# Patient Record
Sex: FEMALE | Race: WHITE | NOT HISPANIC OR LATINO | Employment: FULL TIME | URBAN - METROPOLITAN AREA
[De-identification: names, ages, dates, MRNs, and addresses within clinical notes are randomized per-mention and may not be internally consistent; named-entity substitution may affect disease eponyms.]

---

## 2022-01-03 ENCOUNTER — LAB (OUTPATIENT)
Dept: LAB | Facility: HOSPITAL | Age: 27
End: 2022-01-03

## 2022-01-03 ENCOUNTER — TRANSCRIBE ORDERS (OUTPATIENT)
Dept: LAB | Facility: HOSPITAL | Age: 27
End: 2022-01-03

## 2022-01-03 DIAGNOSIS — Z3A.15 15 WEEKS GESTATION OF PREGNANCY: Primary | ICD-10-CM

## 2022-01-03 DIAGNOSIS — Z34.82 PRENATAL CARE, SUBSEQUENT PREGNANCY, SECOND TRIMESTER: ICD-10-CM

## 2022-01-03 DIAGNOSIS — Z3A.15 15 WEEKS GESTATION OF PREGNANCY: ICD-10-CM

## 2022-01-03 LAB
ABO GROUP BLD: NORMAL
AMPHET+METHAMPHET UR QL: NEGATIVE
AMPHETAMINES UR QL: NEGATIVE
BARBITURATES UR QL SCN: NEGATIVE
BASOPHILS # BLD AUTO: 0.03 10*3/MM3 (ref 0–0.2)
BASOPHILS NFR BLD AUTO: 0.4 % (ref 0–1.5)
BENZODIAZ UR QL SCN: NEGATIVE
BILIRUB UR QL STRIP: NEGATIVE
BLD GP AB SCN SERPL QL: NEGATIVE
BUPRENORPHINE SERPL-MCNC: NEGATIVE NG/ML
CANNABINOIDS SERPL QL: NEGATIVE
CLARITY UR: CLEAR
COCAINE UR QL: NEGATIVE
COLOR UR: YELLOW
DEPRECATED RDW RBC AUTO: 38.9 FL (ref 37–54)
EOSINOPHIL # BLD AUTO: 0.15 10*3/MM3 (ref 0–0.4)
EOSINOPHIL NFR BLD AUTO: 1.8 % (ref 0.3–6.2)
ERYTHROCYTE [DISTWIDTH] IN BLOOD BY AUTOMATED COUNT: 11.8 % (ref 12.3–15.4)
GLUCOSE UR STRIP-MCNC: NEGATIVE MG/DL
HCT VFR BLD AUTO: 34.6 % (ref 34–46.6)
HGB BLD-MCNC: 12 G/DL (ref 12–15.9)
HGB UR QL STRIP.AUTO: NEGATIVE
IMM GRANULOCYTES # BLD AUTO: 0.03 10*3/MM3 (ref 0–0.05)
IMM GRANULOCYTES NFR BLD AUTO: 0.4 % (ref 0–0.5)
KETONES UR QL STRIP: NEGATIVE
LEUKOCYTE ESTERASE UR QL STRIP.AUTO: NEGATIVE
LYMPHOCYTES # BLD AUTO: 1.78 10*3/MM3 (ref 0.7–3.1)
LYMPHOCYTES NFR BLD AUTO: 20.8 % (ref 19.6–45.3)
MCH RBC QN AUTO: 31.5 PG (ref 26.6–33)
MCHC RBC AUTO-ENTMCNC: 34.7 G/DL (ref 31.5–35.7)
MCV RBC AUTO: 90.8 FL (ref 79–97)
METHADONE UR QL SCN: NEGATIVE
MONOCYTES # BLD AUTO: 0.59 10*3/MM3 (ref 0.1–0.9)
MONOCYTES NFR BLD AUTO: 6.9 % (ref 5–12)
NEUTROPHILS NFR BLD AUTO: 5.97 10*3/MM3 (ref 1.7–7)
NEUTROPHILS NFR BLD AUTO: 69.7 % (ref 42.7–76)
NITRITE UR QL STRIP: NEGATIVE
NRBC BLD AUTO-RTO: 0 /100 WBC (ref 0–0.2)
OPIATES UR QL: NEGATIVE
OXYCODONE UR QL SCN: NEGATIVE
PCP UR QL SCN: NEGATIVE
PH UR STRIP.AUTO: 7 [PH] (ref 5–8)
PLATELET # BLD AUTO: 270 10*3/MM3 (ref 140–450)
PMV BLD AUTO: 9.2 FL (ref 6–12)
PROPOXYPH UR QL: NEGATIVE
PROT UR QL STRIP: NEGATIVE
RBC # BLD AUTO: 3.81 10*6/MM3 (ref 3.77–5.28)
RH BLD: POSITIVE
SP GR UR STRIP: 1.01 (ref 1–1.03)
TRICYCLICS UR QL SCN: NEGATIVE
UROBILINOGEN UR QL STRIP: NORMAL
WBC NRBC COR # BLD: 8.55 10*3/MM3 (ref 3.4–10.8)

## 2022-01-03 PROCEDURE — 36415 COLL VENOUS BLD VENIPUNCTURE: CPT

## 2022-01-03 PROCEDURE — 80306 DRUG TEST PRSMV INSTRMNT: CPT

## 2022-01-03 PROCEDURE — 86803 HEPATITIS C AB TEST: CPT

## 2022-01-03 PROCEDURE — 80081 OBSTETRIC PANEL INC HIV TSTG: CPT

## 2022-01-03 PROCEDURE — 81003 URINALYSIS AUTO W/O SCOPE: CPT

## 2022-01-03 PROCEDURE — 82947 ASSAY GLUCOSE BLOOD QUANT: CPT

## 2022-01-04 LAB
GLUCOSE SERPL-MCNC: 81 MG/DL (ref 65–99)
HBV SURFACE AG SERPL QL IA: NORMAL
HCV AB SER DONR QL: NORMAL
HIV1+2 AB SER QL: NORMAL
RPR SER QL: NORMAL

## 2022-01-06 LAB — RUBV IGG SERPL IA-ACNC: 11.2 INDEX

## 2022-04-19 ENCOUNTER — TELEPHONE (OUTPATIENT)
Dept: ENDOCRINOLOGY | Facility: CLINIC | Age: 27
End: 2022-04-19

## 2022-04-29 ENCOUNTER — OFFICE VISIT (OUTPATIENT)
Dept: ENDOCRINOLOGY | Facility: CLINIC | Age: 27
End: 2022-04-29

## 2022-04-29 VITALS
DIASTOLIC BLOOD PRESSURE: 58 MMHG | HEIGHT: 64 IN | HEART RATE: 88 BPM | WEIGHT: 148 LBS | OXYGEN SATURATION: 97 % | SYSTOLIC BLOOD PRESSURE: 114 MMHG | BODY MASS INDEX: 25.27 KG/M2

## 2022-04-29 DIAGNOSIS — O24.419 GESTATIONAL DIABETES MELLITUS (GDM), ANTEPARTUM, GESTATIONAL DIABETES METHOD OF CONTROL UNSPECIFIED: Primary | ICD-10-CM

## 2022-04-29 LAB
EXPIRATION DATE: ABNORMAL
EXPIRATION DATE: NORMAL
EXPIRATION DATE: NORMAL
GLUCOSE BLDC GLUCOMTR-MCNC: 63 MG/DL (ref 70–130)
GLUCOSE BLDC GLUCOMTR-MCNC: 76 MG/DL (ref 70–130)
HBA1C MFR BLD: 4.8 %
Lab: ABNORMAL
Lab: NORMAL
Lab: NORMAL

## 2022-04-29 PROCEDURE — 82947 ASSAY GLUCOSE BLOOD QUANT: CPT | Performed by: INTERNAL MEDICINE

## 2022-04-29 PROCEDURE — 99203 OFFICE O/P NEW LOW 30 MIN: CPT | Performed by: INTERNAL MEDICINE

## 2022-04-29 PROCEDURE — 83036 HEMOGLOBIN GLYCOSYLATED A1C: CPT | Performed by: INTERNAL MEDICINE

## 2022-04-29 RX ORDER — LANCETS 30 GAUGE
EACH MISCELLANEOUS
Qty: 200 EACH | Refills: 1 | Status: SHIPPED | OUTPATIENT
Start: 2022-04-29

## 2022-04-29 RX ORDER — GLUCOSAMINE HCL/CHONDROITIN SU 500-400 MG
CAPSULE ORAL
Qty: 200 EACH | Refills: 1 | Status: SHIPPED | OUTPATIENT
Start: 2022-04-29 | End: 2022-06-28 | Stop reason: HOSPADM

## 2022-04-29 RX ORDER — GLUCOSAMINE HCL/CHONDROITIN SU 500-400 MG
CAPSULE ORAL
COMMUNITY
End: 2022-04-29 | Stop reason: SDUPTHER

## 2022-04-29 RX ORDER — LANCETS 30 GAUGE
EACH MISCELLANEOUS
COMMUNITY
End: 2022-04-29 | Stop reason: SDUPTHER

## 2022-04-29 RX ORDER — PRENATAL VIT NO.126/IRON/FOLIC 28MG-0.8MG
TABLET ORAL 3 TIMES DAILY
COMMUNITY

## 2022-04-29 NOTE — PROGRESS NOTES
Chief Complaint   Patient presents with   • Gestational Diabetes     TORIE 6-21-22        New patient who is being seen in consultation regarding gestational diabetes at the request of Samantha Azevedo MD     HPI   Vane Maguire is a 26 y.o. female who presents for evaluation of gestational diabetes.    Patient presents for evaluation of gestational diabetes. She is 32 weeks gestation, this is her first pregnancy. She denies any personal history of diabetes or prediabetes. She does report that her sister had gestational diabetes. She was diagnosed via routine screening in pregnancy, gestational GCT with value of 168, 3-hour OGTT with values of71 fasting, 183 1 hour, 162 at 2 hours, 106 at 3 hours.  Patient denies any food allergies or food aversions.  She is not following any particular diet prior to diagnosis.  She does report that she is started working to become more mindful of starches since diagnosis.  Patient reports she had an recent ultrasound due to previous low-lying placenta.  She reports that baby is measuring normally at the time of the last ultrasound.  She denies any other complications during this pregnancy.  She reports she is drinking mostly water.  She has not yet begun glycemic monitoring.    Past Medical History:   Diagnosis Date   • Gestational diabetes      Past Surgical History:   Procedure Laterality Date   • LASIK        Family History   Problem Relation Age of Onset   • Hypertension Father       Social History     Socioeconomic History   • Marital status:    Tobacco Use   • Smoking status: Never Smoker   • Smokeless tobacco: Never Used   Vaping Use   • Vaping Use: Never used   Substance and Sexual Activity   • Alcohol use: Never   • Drug use: Never   • Sexual activity: Defer      No Known Allergies   Current Outpatient Medications on File Prior to Visit   Medication Sig Dispense Refill   • Blood Glucose-BP Monitor device 1 kit. Test four times as directed ok to change to ins  "formulary     • prenatal vitamin (prenatal, CLASSIC, vitamin) tablet Take  by mouth 3 (Three) Times a Day.     • [DISCONTINUED] acetone, urine, test strip 1 strip Daily. Use as directed to test for sugar in the urine     • [DISCONTINUED] Glucose Blood (Blood Glucose Test) strip by In Vitro route. Test four times as directed ok to change to ins formulary     • [DISCONTINUED] Lancets misc Test four times as directed ok to change to ins formulary       No current facility-administered medications on file prior to visit.        Review of Systems   Constitutional: Positive for fatigue. Negative for unexpected weight gain and unexpected weight loss.   HENT: Negative for trouble swallowing and voice change.    Eyes: Negative for pain and visual disturbance.   Respiratory: Negative for cough and shortness of breath.    Cardiovascular: Negative for palpitations and leg swelling.   Gastrointestinal: Negative for nausea and vomiting.   Endocrine: Negative for polydipsia and polyuria.   Musculoskeletal: Negative for arthralgias and myalgias.   Skin: Negative for dry skin and rash.   Neurological: Negative for tremors and headache.   Psychiatric/Behavioral: Negative for sleep disturbance. The patient is not nervous/anxious.        Vitals:    04/29/22 1109   BP: 114/58   BP Location: Left arm   Patient Position: Sitting   Cuff Size: Adult   Pulse: 88   SpO2: 97%   Weight: 67.1 kg (148 lb)   Height: 162.6 cm (64\")   Body mass index is 25.4 kg/m².     Physical Exam  Vitals reviewed.   Constitutional:       General: She is not in acute distress.     Appearance: Normal appearance.   HENT:      Head: Normocephalic and atraumatic.      Right Ear: Hearing normal.      Left Ear: Hearing normal.      Nose: Nose normal.   Eyes:      General: Lids are normal.      Conjunctiva/sclera: Conjunctivae normal.   Neck:      Thyroid: No thyromegaly or thyroid tenderness.   Cardiovascular:      Rate and Rhythm: Normal rate and regular rhythm.      " Heart sounds: No murmur heard.  Pulmonary:      Effort: Pulmonary effort is normal.      Breath sounds: Normal breath sounds.   Abdominal:      General: Bowel sounds are normal.      Tenderness: There is no abdominal tenderness.      Comments: gravid   Lymphadenopathy:      Head:      Right side of head: No submandibular adenopathy.      Left side of head: No submandibular adenopathy.      Cervical: No cervical adenopathy.   Skin:     General: Skin is warm and dry.   Neurological:      General: No focal deficit present.      Deep Tendon Reflexes: Reflexes are normal and symmetric.   Psychiatric:         Mood and Affect: Mood and affect normal.         Behavior: Behavior is cooperative.         Labs/Imaging   Latest Reference Range & Units 04/29/22 11:40 04/29/22 11:41 04/29/22 12:01   Glucose 70 - 130 mg/dL 63 !  76   Hemoglobin A1C %  4.8    !: Data is abnormal    Assessment and Plan    Diagnoses and all orders for this visit:    1. Gestational diabetes mellitus (GDM), antepartum, gestational diabetes method of control unspecified (Primary)  -currently 32 weeks gestation  -Diagnosed via routine screening in pregnancy.  -Hemoglobin A1c 4.8% during visit today, discussed that this is normal but can be falsely low in pregnancy.  Discussed that gestational diabetes is a risk factor for development of type 2 diabetes later in life or gestational diabetes in a subsequent pregnancy.  -Patient mildly hypoglycemic upon arrival to office, this corrected with intake of carbohydrates.  Likely secondary to prolonged fasting state, patient is asymptomatic.  Reviewed hypoglycemia management.  Discussed importance of snacks between meals.  -Patient has not yet monitoring blood glucose, supplies sent to pharmacy today  --Reviewed instructions for glycemic monitoring and the monitoring of urine ketones.  -Discussed that gestational diabetes can become harder to control as pregnancy progresses. Reviewed need for routine follow up  from now until delivery.   -Patient instructed to send glucose data weekly via [employer].  -Risks of gestational diabetes were reviewed, these include, but are not limited to: LGA infant, macrosomia, stillbirth,  hypoglycemia, hyperbilirubinemia, birth injury,  birth, respiratory complications following delivery, polyhydramnios, hypocalcemia, maternal preeclampsia.  -Nutritional goals reviewed: Patient advised to eat 3 moderate sized meals daily as well as 2-4 snacks, including a bedtime snack. Discussed need for carbohydrate awareness. Patient given goals for carbohydrate intake for meals/snacks.  -Glycemic Targets during pregnancy were reviewed, as follows: fasting glucose <95, 1 hour post prandial <140, 2 hour postprandial <120.  -Patient advised that she will need to monitor blood glucose up to 6-8 times daily. In the event that insurance may not cover adequate testing supplies, she was instructed she may need to purchase on OTC meter and strips.  -Patient will return for follow up in 2 weeks. She was instructed to contact the office in the interim if glucoses not at target.  -     POC Glucose, Blood  -     POC Glycosylated Hemoglobin (Hb A1C)      Return in about 2 weeks (around 2022) for Next scheduled follow up. The patient was instructed to contact the clinic with any interval questions or concerns.    Orquidea Martinez MD     Dictated Utilizing Dragon Dictation

## 2022-05-12 ENCOUNTER — TELEPHONE (OUTPATIENT)
Dept: ENDOCRINOLOGY | Facility: CLINIC | Age: 27
End: 2022-05-12

## 2022-05-12 NOTE — TELEPHONE ENCOUNTER
Spoke to pt-dr castillo does not have any more openings on 5/19.  Transferred to front office to change appt.

## 2022-05-12 NOTE — TELEPHONE ENCOUNTER
Patient would like to know if she can come in at a later time on 05/19/2022 due to her having a obgyn appointment on that same day and around that same time?. The patient also states that she is willing to be work in on another day as well .    Please advise and call the patient back in regards to this matter at [phone].

## 2022-05-19 ENCOUNTER — OFFICE VISIT (OUTPATIENT)
Dept: ENDOCRINOLOGY | Facility: CLINIC | Age: 27
End: 2022-05-19

## 2022-05-19 VITALS
BODY MASS INDEX: 25.78 KG/M2 | OXYGEN SATURATION: 99 % | WEIGHT: 151 LBS | DIASTOLIC BLOOD PRESSURE: 60 MMHG | SYSTOLIC BLOOD PRESSURE: 120 MMHG | HEIGHT: 64 IN | HEART RATE: 84 BPM

## 2022-05-19 DIAGNOSIS — O24.419 GESTATIONAL DIABETES MELLITUS (GDM), ANTEPARTUM, GESTATIONAL DIABETES METHOD OF CONTROL UNSPECIFIED: Primary | ICD-10-CM

## 2022-05-19 LAB
EXPIRATION DATE: NORMAL
GLUCOSE BLDC GLUCOMTR-MCNC: 88 MG/DL (ref 70–130)
Lab: NORMAL

## 2022-05-19 PROCEDURE — 99212 OFFICE O/P EST SF 10 MIN: CPT | Performed by: INTERNAL MEDICINE

## 2022-05-19 PROCEDURE — 82947 ASSAY GLUCOSE BLOOD QUANT: CPT | Performed by: INTERNAL MEDICINE

## 2022-05-19 NOTE — PROGRESS NOTES
"Chief Complaint   Patient presents with   • Gestational Diabetes        HPI   Vane Maguire is a 26 y.o. female had concerns including Gestational Diabetes.     Patient is now approximately 35 weeks gestation.  She saw OB earlier today and was told the baby is measuring normally.  She reports there was delay in obtaining her testing supplies and states she only started monitoring blood glucose approximately 4 days ago.  She did send values from earlier this week, all of which were controlled.  She reports she has not made any significant dietary changes that she wanted to see what values would look like on her usual diet.  She denies any increased thirst or increased urination.  She denies any nausea or vomiting.  She reports baby is moving well.    The following portions of the patient's history were reviewed and updated as appropriate: allergies, current medications and past social history.    Review of Systems   Gastrointestinal: Negative for abdominal pain, nausea and vomiting.   Endocrine: Negative for polydipsia and polyuria.      /60 (BP Location: Left arm, Patient Position: Sitting, Cuff Size: Adult)   Pulse 84   Ht 162.6 cm (64\")   Wt 68.5 kg (151 lb)   SpO2 99%   BMI 25.92 kg/m²      Physical Exam      Constitutional:  well developed; well nourished  no acute distress   ENT/Thyroid: not examined   Eyes: Conjunctiva: clear   Respiratory:  breathing is unlabored  clear to auscultation bilaterally   Cardiovascular:  regular rate and rhythm   Chest:  Not performed.   Abdomen: gravid   : Not performed.   Musculoskeletal: Not performed   Skin: not performed.   Neuro: mental status, speech normal   Psych: mood and affect are within normal limits       Labs/Imaging   Latest Reference Range & Units 05/19/22 14:44   Glucose 70 - 130 mg/dL 88       Diagnoses and all orders for this visit:    1. Gestational diabetes mellitus (GDM), antepartum, gestational diabetes method of control unspecified " (Primary)  -Currently 35 weeks gestation  -Diet-controlled, all values in the past 4 days at goal  --Reviewed instructions for glycemic monitoring and the monitoring of urine ketones.  -Discussed that gestational diabetes can become harder to control as pregnancy progresses. Reviewed need for routine follow up from now until delivery.   -Patient instructed to send glucose data weekly via "[employer].  -Risks of gestational diabetes were reviewed, these include, but are not limited to: LGA infant, macrosomia, stillbirth,  hypoglycemia, hyperbilirubinemia, birth injury,  birth, respiratory complications following delivery, polyhydramnios, hypocalcemia, maternal preeclampsia.  -Nutritional goals reviewed: Patient advised to eat 3 moderate sized meals daily as well as 2-4 snacks, including a bedtime snack. Discussed need for carbohydrate awareness. Patient given goals for carbohydrate intake for meals/snacks.  -Glycemic Targets during pregnancy were reviewed, as follows: fasting glucose <95, 1 hour post prandial <140, 2 hour postprandial <120.  -Patient will return for follow up in 3 weeks. She was instructed to contact the office in the interim if glucoses not at target.  -     POC Glucose, Blood      No follow-ups on file. The patient was instructed to contact the clinic with any interval questions or concerns.    Orquidea Martinez MD   Endocrinologist    Dictated Utilizing Dragon Dictation

## 2022-06-09 ENCOUNTER — OFFICE VISIT (OUTPATIENT)
Dept: ENDOCRINOLOGY | Facility: CLINIC | Age: 27
End: 2022-06-09

## 2022-06-09 VITALS
HEIGHT: 64 IN | WEIGHT: 154 LBS | BODY MASS INDEX: 26.29 KG/M2 | OXYGEN SATURATION: 98 % | SYSTOLIC BLOOD PRESSURE: 114 MMHG | DIASTOLIC BLOOD PRESSURE: 62 MMHG | HEART RATE: 84 BPM

## 2022-06-09 DIAGNOSIS — O24.419 GESTATIONAL DIABETES MELLITUS (GDM), ANTEPARTUM, GESTATIONAL DIABETES METHOD OF CONTROL UNSPECIFIED: Primary | ICD-10-CM

## 2022-06-09 LAB
EXPIRATION DATE: NORMAL
EXPIRATION DATE: NORMAL
GLUCOSE BLDC GLUCOMTR-MCNC: 123 MG/DL (ref 70–130)
HBA1C MFR BLD: 4.9 %
Lab: NORMAL
Lab: NORMAL

## 2022-06-09 PROCEDURE — 83036 HEMOGLOBIN GLYCOSYLATED A1C: CPT | Performed by: INTERNAL MEDICINE

## 2022-06-09 PROCEDURE — 99213 OFFICE O/P EST LOW 20 MIN: CPT | Performed by: INTERNAL MEDICINE

## 2022-06-09 PROCEDURE — 82947 ASSAY GLUCOSE BLOOD QUANT: CPT | Performed by: INTERNAL MEDICINE

## 2022-06-09 PROCEDURE — 3044F HG A1C LEVEL LT 7.0%: CPT | Performed by: INTERNAL MEDICINE

## 2022-06-09 NOTE — PROGRESS NOTES
"Chief Complaint   Patient presents with   • Gestational Diabetes        HPI   Vane Maguire is a 27 y.o. female had concerns including Gestational Diabetes.      Patient presents for follow-up of gestational diabetes.  Her glucose data was reviewed and she has not had any glucose values above goal.  She is scheduled to follow-up with OB at the end of next week.  There are no current plans for induction of labor.  She reports that she generally feels well.  She denies any issues with dietary restrictions.  She reports that baby is moving well and was measuring normally at the time of most recent ultrasound.    The following portions of the patient's history were reviewed and updated as appropriate: allergies, current medications and past social history.    Review of Systems   Cardiovascular: Negative for palpitations.   Gastrointestinal: Negative for nausea and vomiting.   Endocrine: Negative for polydipsia and polyuria.      /62 (BP Location: Left arm, Patient Position: Sitting, Cuff Size: Adult)   Pulse 84   Ht 162.6 cm (64\")   Wt 69.9 kg (154 lb)   SpO2 98%   BMI 26.43 kg/m²      Physical Exam      Constitutional:  well developed; well nourished  no acute distress  appears stated age   ENT/Thyroid: not examined   Eyes: Conjunctiva: clear   Respiratory:  breathing is unlabored  clear to auscultation bilaterally   Cardiovascular:  regular rate and rhythm   Chest:  Not performed.   Abdomen: gravid   : Not performed.   Musculoskeletal: Not performed   Skin: not performed.   Neuro: mental status, speech normal   Psych: mood and affect are within normal limits       Labs/Imaging   Latest Reference Range & Units 06/09/22 10:51   Glucose 70 - 130 mg/dL 123   Hemoglobin A1C % 4.9       Diagnoses and all orders for this visit:    1. Gestational diabetes mellitus (GDM), antepartum, gestational diabetes method of control unspecified (Primary)  -Currently in third trimester, diet-controlled  -All values at " goal in the past 2 and half weeks  -Reviewed instructions for glycemic monitoring and the monitoring of urine ketones.  -Discussed that gestational diabetes can become harder to control as pregnancy progresses. Reviewed need for routine follow up from now until delivery.   -Patient instructed to send glucose data weekly via [employer].  -Risks of gestational diabetes were reviewed, these include, but are not limited to: LGA infant, macrosomia, stillbirth,  hypoglycemia, hyperbilirubinemia, birth injury,  birth, respiratory complications following delivery, polyhydramnios, hypocalcemia, maternal preeclampsia.  -Nutritional goals reviewed: Patient advised to eat 3 moderate sized meals daily as well as 2-4 snacks, including a bedtime snack. Discussed need for carbohydrate awareness. Patient given goals for carbohydrate intake for meals/snacks.  -Glycemic Targets during pregnancy were reviewed, as follows: fasting glucose <95, 1 hour post prandial <140, 2 hour postprandial <120.  -Patient was advised that she may discontinue routine glycemic monitoring following delivery.  We did review that gestational diabetes is a risk factor for development of type 2 diabetes later in life or gestational diabetes is other than pregnancy.  We will plan for follow-up 3 months postpartum, sooner if needed.  -     POC Glucose, Blood  -     POC Glycosylated Hemoglobin (Hb A1C)      Return in about 4 months (around 10/9/2022) for Next scheduled follow up. The patient was instructed to contact the clinic with any interval questions or concerns.    Orquidea Martinez MD   Endocrinologist    Dictated Utilizing Dragon Dictation

## 2022-06-26 ENCOUNTER — HOSPITAL ENCOUNTER (INPATIENT)
Facility: HOSPITAL | Age: 27
LOS: 2 days | Discharge: HOME | End: 2022-06-28
Attending: OBSTETRICS & GYNECOLOGY | Admitting: OBSTETRICS & GYNECOLOGY

## 2022-06-26 PROBLEM — Z3A.40 40 WEEKS GESTATION OF PREGNANCY: Status: ACTIVE | Noted: 2022-06-26

## 2022-06-26 PROBLEM — Z3A.40 40 WEEKS GESTATION OF PREGNANCY: Status: RESOLVED | Noted: 2022-06-26 | Resolved: 2022-06-26

## 2022-06-26 LAB
ABO GROUP BLD: NORMAL
ALP SERPL-CCNC: 203 U/L (ref 39–117)
ALT SERPL W P-5'-P-CCNC: 10 U/L (ref 1–33)
AST SERPL-CCNC: 21 U/L (ref 1–32)
BILIRUB SERPL-MCNC: 0.2 MG/DL (ref 0–1.2)
BLD GP AB SCN SERPL QL: NEGATIVE
CREAT SERPL-MCNC: 0.78 MG/DL (ref 0.57–1)
DEPRECATED RDW RBC AUTO: 38.9 FL (ref 37–54)
ERYTHROCYTE [DISTWIDTH] IN BLOOD BY AUTOMATED COUNT: 11.5 % (ref 12.3–15.4)
HCT VFR BLD AUTO: 39.5 % (ref 34–46.6)
HGB BLD-MCNC: 13.7 G/DL (ref 12–15.9)
LDH SERPL-CCNC: 234 U/L (ref 135–214)
MCH RBC QN AUTO: 32.3 PG (ref 26.6–33)
MCHC RBC AUTO-ENTMCNC: 34.7 G/DL (ref 31.5–35.7)
MCV RBC AUTO: 93.2 FL (ref 79–97)
PLATELET # BLD AUTO: 207 10*3/MM3 (ref 140–450)
PMV BLD AUTO: 10.5 FL (ref 6–12)
RBC # BLD AUTO: 4.24 10*6/MM3 (ref 3.77–5.28)
RH BLD: POSITIVE
SARS-COV-2 RDRP RESP QL NAA+PROBE: NORMAL
T&S EXPIRATION DATE: NORMAL
URATE SERPL-MCNC: 6.2 MG/DL (ref 2.4–5.7)
WBC NRBC COR # BLD: 11.84 10*3/MM3 (ref 3.4–10.8)

## 2022-06-26 PROCEDURE — 86901 BLOOD TYPING SEROLOGIC RH(D): CPT | Performed by: ADVANCED PRACTICE MIDWIFE

## 2022-06-26 PROCEDURE — 0KQM0ZZ REPAIR PERINEUM MUSCLE, OPEN APPROACH: ICD-10-PCS | Performed by: OBSTETRICS & GYNECOLOGY

## 2022-06-26 PROCEDURE — 83615 LACTATE (LD) (LDH) ENZYME: CPT | Performed by: ADVANCED PRACTICE MIDWIFE

## 2022-06-26 PROCEDURE — 59025 FETAL NON-STRESS TEST: CPT

## 2022-06-26 PROCEDURE — 84075 ASSAY ALKALINE PHOSPHATASE: CPT | Performed by: ADVANCED PRACTICE MIDWIFE

## 2022-06-26 PROCEDURE — 87635 SARS-COV-2 COVID-19 AMP PRB: CPT | Performed by: ADVANCED PRACTICE MIDWIFE

## 2022-06-26 PROCEDURE — 84460 ALANINE AMINO (ALT) (SGPT): CPT | Performed by: ADVANCED PRACTICE MIDWIFE

## 2022-06-26 PROCEDURE — 82565 ASSAY OF CREATININE: CPT | Performed by: ADVANCED PRACTICE MIDWIFE

## 2022-06-26 PROCEDURE — 84550 ASSAY OF BLOOD/URIC ACID: CPT | Performed by: ADVANCED PRACTICE MIDWIFE

## 2022-06-26 PROCEDURE — 85027 COMPLETE CBC AUTOMATED: CPT | Performed by: ADVANCED PRACTICE MIDWIFE

## 2022-06-26 PROCEDURE — 82247 BILIRUBIN TOTAL: CPT | Performed by: ADVANCED PRACTICE MIDWIFE

## 2022-06-26 PROCEDURE — 86900 BLOOD TYPING SEROLOGIC ABO: CPT | Performed by: ADVANCED PRACTICE MIDWIFE

## 2022-06-26 PROCEDURE — 86850 RBC ANTIBODY SCREEN: CPT | Performed by: ADVANCED PRACTICE MIDWIFE

## 2022-06-26 PROCEDURE — 84450 TRANSFERASE (AST) (SGOT): CPT | Performed by: ADVANCED PRACTICE MIDWIFE

## 2022-06-26 RX ORDER — SODIUM CHLORIDE, SODIUM LACTATE, POTASSIUM CHLORIDE, CALCIUM CHLORIDE 600; 310; 30; 20 MG/100ML; MG/100ML; MG/100ML; MG/100ML
125 INJECTION, SOLUTION INTRAVENOUS CONTINUOUS
Status: DISCONTINUED | OUTPATIENT
Start: 2022-06-26 | End: 2022-06-28 | Stop reason: HOSPADM

## 2022-06-26 RX ORDER — SODIUM CHLORIDE 0.9 % (FLUSH) 0.9 %
10 SYRINGE (ML) INJECTION EVERY 12 HOURS SCHEDULED
Status: DISCONTINUED | OUTPATIENT
Start: 2022-06-26 | End: 2022-06-26 | Stop reason: HOSPADM

## 2022-06-26 RX ORDER — LIDOCAINE HYDROCHLORIDE 10 MG/ML
INJECTION, SOLUTION INFILTRATION; PERINEURAL
Status: DISCONTINUED
Start: 2022-06-26 | End: 2022-06-28 | Stop reason: HOSPADM

## 2022-06-26 RX ORDER — DOCUSATE SODIUM 100 MG/1
100 CAPSULE, LIQUID FILLED ORAL 2 TIMES DAILY
Status: DISCONTINUED | OUTPATIENT
Start: 2022-06-26 | End: 2022-06-28 | Stop reason: HOSPADM

## 2022-06-26 RX ORDER — ONDANSETRON 4 MG/1
4 TABLET, FILM COATED ORAL EVERY 6 HOURS PRN
Status: DISCONTINUED | OUTPATIENT
Start: 2022-06-26 | End: 2022-06-26 | Stop reason: HOSPADM

## 2022-06-26 RX ORDER — FAMOTIDINE 20 MG/1
20 TABLET, FILM COATED ORAL EVERY 12 HOURS PRN
Status: DISCONTINUED | OUTPATIENT
Start: 2022-06-26 | End: 2022-06-26 | Stop reason: HOSPADM

## 2022-06-26 RX ORDER — SODIUM CHLORIDE 0.9 % (FLUSH) 0.9 %
10 SYRINGE (ML) INJECTION AS NEEDED
Status: DISCONTINUED | OUTPATIENT
Start: 2022-06-26 | End: 2022-06-26 | Stop reason: HOSPADM

## 2022-06-26 RX ORDER — MAGNESIUM CARB/ALUMINUM HYDROX 105-160MG
30 TABLET,CHEWABLE ORAL ONCE
Status: DISCONTINUED | OUTPATIENT
Start: 2022-06-26 | End: 2022-06-26 | Stop reason: HOSPADM

## 2022-06-26 RX ORDER — ERYTHROMYCIN 5 MG/G
OINTMENT OPHTHALMIC
Status: DISCONTINUED
Start: 2022-06-26 | End: 2022-06-28 | Stop reason: HOSPADM

## 2022-06-26 RX ORDER — LIDOCAINE HYDROCHLORIDE 10 MG/ML
5 INJECTION, SOLUTION EPIDURAL; INFILTRATION; INTRACAUDAL; PERINEURAL AS NEEDED
Status: DISCONTINUED | OUTPATIENT
Start: 2022-06-26 | End: 2022-06-26 | Stop reason: HOSPADM

## 2022-06-26 RX ORDER — BISACODYL 10 MG
10 SUPPOSITORY, RECTAL RECTAL DAILY PRN
Status: DISCONTINUED | OUTPATIENT
Start: 2022-06-27 | End: 2022-06-28 | Stop reason: HOSPADM

## 2022-06-26 RX ORDER — SODIUM CHLORIDE 0.9 % (FLUSH) 0.9 %
1-10 SYRINGE (ML) INJECTION AS NEEDED
Status: DISCONTINUED | OUTPATIENT
Start: 2022-06-26 | End: 2022-06-28 | Stop reason: HOSPADM

## 2022-06-26 RX ORDER — HYDROCORTISONE 25 MG/G
1 CREAM TOPICAL AS NEEDED
Status: DISCONTINUED | OUTPATIENT
Start: 2022-06-26 | End: 2022-06-28 | Stop reason: HOSPADM

## 2022-06-26 RX ORDER — PRENATAL VIT/IRON FUM/FOLIC AC 27MG-0.8MG
1 TABLET ORAL DAILY
Status: DISCONTINUED | OUTPATIENT
Start: 2022-06-26 | End: 2022-06-28 | Stop reason: HOSPADM

## 2022-06-26 RX ORDER — FAMOTIDINE 10 MG/ML
20 INJECTION, SOLUTION INTRAVENOUS EVERY 12 HOURS PRN
Status: DISCONTINUED | OUTPATIENT
Start: 2022-06-26 | End: 2022-06-26 | Stop reason: HOSPADM

## 2022-06-26 RX ORDER — OXYTOCIN/0.9 % SODIUM CHLORIDE 30/500 ML
85 PLASTIC BAG, INJECTION (ML) INTRAVENOUS ONCE
Status: COMPLETED | OUTPATIENT
Start: 2022-06-26 | End: 2022-06-26

## 2022-06-26 RX ORDER — ONDANSETRON 2 MG/ML
4 INJECTION INTRAMUSCULAR; INTRAVENOUS EVERY 6 HOURS PRN
Status: DISCONTINUED | OUTPATIENT
Start: 2022-06-26 | End: 2022-06-28 | Stop reason: HOSPADM

## 2022-06-26 RX ORDER — BUTORPHANOL TARTRATE 1 MG/ML
1 INJECTION, SOLUTION INTRAMUSCULAR; INTRAVENOUS
Status: DISCONTINUED | OUTPATIENT
Start: 2022-06-26 | End: 2022-06-26 | Stop reason: HOSPADM

## 2022-06-26 RX ORDER — ONDANSETRON 2 MG/ML
4 INJECTION INTRAMUSCULAR; INTRAVENOUS EVERY 6 HOURS PRN
Status: DISCONTINUED | OUTPATIENT
Start: 2022-06-26 | End: 2022-06-26 | Stop reason: HOSPADM

## 2022-06-26 RX ORDER — METHYLERGONOVINE MALEATE 0.2 MG/ML
200 INJECTION INTRAVENOUS ONCE AS NEEDED
Status: DISCONTINUED | OUTPATIENT
Start: 2022-06-26 | End: 2022-06-26 | Stop reason: HOSPADM

## 2022-06-26 RX ORDER — IBUPROFEN 600 MG/1
600 TABLET ORAL EVERY 6 HOURS PRN
Status: DISCONTINUED | OUTPATIENT
Start: 2022-06-26 | End: 2022-06-28 | Stop reason: HOSPADM

## 2022-06-26 RX ORDER — MISOPROSTOL 200 UG/1
800 TABLET ORAL ONCE AS NEEDED
Status: DISCONTINUED | OUTPATIENT
Start: 2022-06-26 | End: 2022-06-26 | Stop reason: HOSPADM

## 2022-06-26 RX ORDER — OXYTOCIN/0.9 % SODIUM CHLORIDE 30/500 ML
PLASTIC BAG, INJECTION (ML) INTRAVENOUS
Status: DISCONTINUED
Start: 2022-06-26 | End: 2022-06-28 | Stop reason: HOSPADM

## 2022-06-26 RX ORDER — OXYTOCIN/0.9 % SODIUM CHLORIDE 30/500 ML
650 PLASTIC BAG, INJECTION (ML) INTRAVENOUS ONCE
Status: COMPLETED | OUTPATIENT
Start: 2022-06-26 | End: 2022-06-26

## 2022-06-26 RX ORDER — CARBOPROST TROMETHAMINE 250 UG/ML
250 INJECTION, SOLUTION INTRAMUSCULAR
Status: DISCONTINUED | OUTPATIENT
Start: 2022-06-26 | End: 2022-06-26 | Stop reason: HOSPADM

## 2022-06-26 RX ADMIN — PRENATAL VITAMINS-IRON FUMARATE 27 MG IRON-FOLIC ACID 0.8 MG TABLET 1 TABLET: at 08:49

## 2022-06-26 RX ADMIN — OXYTOCIN 650 ML/HR: 10 INJECTION INTRAVENOUS at 05:16

## 2022-06-26 RX ADMIN — DOCUSATE SODIUM 100 MG: 100 CAPSULE, LIQUID FILLED ORAL at 08:49

## 2022-06-26 RX ADMIN — OXYTOCIN 85 ML/HR: 10 INJECTION INTRAVENOUS at 06:15

## 2022-06-26 RX ADMIN — IBUPROFEN 600 MG: 600 TABLET ORAL at 08:49

## 2022-06-26 RX ADMIN — DOCUSATE SODIUM 100 MG: 100 CAPSULE, LIQUID FILLED ORAL at 21:01

## 2022-06-26 RX ADMIN — SODIUM CHLORIDE, POTASSIUM CHLORIDE, SODIUM LACTATE AND CALCIUM CHLORIDE 125 ML/HR: 600; 310; 30; 20 INJECTION, SOLUTION INTRAVENOUS at 02:56

## 2022-06-27 LAB
BASOPHILS # BLD AUTO: 0.04 10*3/MM3 (ref 0–0.2)
BASOPHILS NFR BLD AUTO: 0.3 % (ref 0–1.5)
DEPRECATED RDW RBC AUTO: 41.2 FL (ref 37–54)
EOSINOPHIL # BLD AUTO: 0.11 10*3/MM3 (ref 0–0.4)
EOSINOPHIL NFR BLD AUTO: 0.9 % (ref 0.3–6.2)
ERYTHROCYTE [DISTWIDTH] IN BLOOD BY AUTOMATED COUNT: 11.9 % (ref 12.3–15.4)
HCT VFR BLD AUTO: 28.5 % (ref 34–46.6)
HGB BLD-MCNC: 9.5 G/DL (ref 12–15.9)
IMM GRANULOCYTES # BLD AUTO: 0.04 10*3/MM3 (ref 0–0.05)
IMM GRANULOCYTES NFR BLD AUTO: 0.3 % (ref 0–0.5)
LYMPHOCYTES # BLD AUTO: 3.28 10*3/MM3 (ref 0.7–3.1)
LYMPHOCYTES NFR BLD AUTO: 26.5 % (ref 19.6–45.3)
MCH RBC QN AUTO: 32 PG (ref 26.6–33)
MCHC RBC AUTO-ENTMCNC: 33.3 G/DL (ref 31.5–35.7)
MCV RBC AUTO: 96 FL (ref 79–97)
MONOCYTES # BLD AUTO: 0.71 10*3/MM3 (ref 0.1–0.9)
MONOCYTES NFR BLD AUTO: 5.7 % (ref 5–12)
NEUTROPHILS NFR BLD AUTO: 66.3 % (ref 42.7–76)
NEUTROPHILS NFR BLD AUTO: 8.19 10*3/MM3 (ref 1.7–7)
NRBC BLD AUTO-RTO: 0 /100 WBC (ref 0–0.2)
PLATELET # BLD AUTO: 191 10*3/MM3 (ref 140–450)
PMV BLD AUTO: 10.1 FL (ref 6–12)
RBC # BLD AUTO: 2.97 10*6/MM3 (ref 3.77–5.28)
WBC NRBC COR # BLD: 12.37 10*3/MM3 (ref 3.4–10.8)

## 2022-06-27 PROCEDURE — 85025 COMPLETE CBC W/AUTO DIFF WBC: CPT | Performed by: ADVANCED PRACTICE MIDWIFE

## 2022-06-27 RX ADMIN — IBUPROFEN 600 MG: 600 TABLET ORAL at 08:01

## 2022-06-27 RX ADMIN — PRENATAL VITAMINS-IRON FUMARATE 27 MG IRON-FOLIC ACID 0.8 MG TABLET 1 TABLET: at 08:01

## 2022-06-27 RX ADMIN — DOCUSATE SODIUM 100 MG: 100 CAPSULE, LIQUID FILLED ORAL at 20:31

## 2022-06-27 RX ADMIN — DOCUSATE SODIUM 100 MG: 100 CAPSULE, LIQUID FILLED ORAL at 08:01

## 2022-06-28 VITALS
BODY MASS INDEX: 26.12 KG/M2 | HEIGHT: 64 IN | TEMPERATURE: 98.6 F | RESPIRATION RATE: 16 BRPM | WEIGHT: 153 LBS | DIASTOLIC BLOOD PRESSURE: 68 MMHG | SYSTOLIC BLOOD PRESSURE: 114 MMHG | HEART RATE: 86 BPM

## 2022-06-28 RX ORDER — IBUPROFEN 600 MG/1
600 TABLET ORAL EVERY 6 HOURS PRN
Qty: 60 TABLET | Refills: 0 | Status: SHIPPED | OUTPATIENT
Start: 2022-06-28

## 2022-06-28 RX ORDER — FERROUS SULFATE 325(65) MG
325 TABLET ORAL
Qty: 60 TABLET | Refills: 0 | Status: SHIPPED | OUTPATIENT
Start: 2022-06-28

## 2022-06-28 RX ADMIN — IBUPROFEN 600 MG: 600 TABLET ORAL at 08:21

## 2022-06-28 RX ADMIN — PRENATAL VITAMINS-IRON FUMARATE 27 MG IRON-FOLIC ACID 0.8 MG TABLET 1 TABLET: at 08:21

## 2022-06-28 RX ADMIN — DOCUSATE SODIUM 100 MG: 100 CAPSULE, LIQUID FILLED ORAL at 08:21

## 2023-02-10 ENCOUNTER — TRANSCRIBE ORDERS (OUTPATIENT)
Dept: LAB | Facility: HOSPITAL | Age: 28
End: 2023-02-10
Payer: MEDICAID

## 2023-02-10 ENCOUNTER — LAB (OUTPATIENT)
Dept: LAB | Facility: HOSPITAL | Age: 28
End: 2023-02-10
Payer: MEDICAID

## 2023-02-10 DIAGNOSIS — Z32.01 ENCOUNTER FOR PREGNANCY TEST, RESULT POSITIVE: ICD-10-CM

## 2023-02-10 DIAGNOSIS — Z32.01 ENCOUNTER FOR PREGNANCY TEST, RESULT POSITIVE: Primary | ICD-10-CM

## 2023-02-10 LAB
ABO GROUP BLD: NORMAL
AMPHET+METHAMPHET UR QL: NEGATIVE
AMPHETAMINES UR QL: NEGATIVE
BARBITURATES UR QL SCN: NEGATIVE
BENZODIAZ UR QL SCN: NEGATIVE
BLD GP AB SCN SERPL QL: NEGATIVE
BUPRENORPHINE SERPL-MCNC: NEGATIVE NG/ML
CANNABINOIDS SERPL QL: NEGATIVE
COCAINE UR QL: NEGATIVE
DEPRECATED RDW RBC AUTO: 44.2 FL (ref 37–54)
ERYTHROCYTE [DISTWIDTH] IN BLOOD BY AUTOMATED COUNT: 13.1 % (ref 12.3–15.4)
HCT VFR BLD AUTO: 34 % (ref 34–46.6)
HGB BLD-MCNC: 11.7 G/DL (ref 12–15.9)
MCH RBC QN AUTO: 32.1 PG (ref 26.6–33)
MCHC RBC AUTO-ENTMCNC: 34.4 G/DL (ref 31.5–35.7)
MCV RBC AUTO: 93.2 FL (ref 79–97)
METHADONE UR QL SCN: NEGATIVE
OPIATES UR QL: NEGATIVE
OXYCODONE UR QL SCN: NEGATIVE
PCP UR QL SCN: NEGATIVE
PLATELET # BLD AUTO: 211 10*3/MM3 (ref 140–450)
PMV BLD AUTO: 9.6 FL (ref 6–12)
PROPOXYPH UR QL: NEGATIVE
RBC # BLD AUTO: 3.65 10*6/MM3 (ref 3.77–5.28)
RH BLD: POSITIVE
TRICYCLICS UR QL SCN: NEGATIVE
WBC NRBC COR # BLD: 4.65 10*3/MM3 (ref 3.4–10.8)

## 2023-02-10 PROCEDURE — 85027 COMPLETE CBC AUTOMATED: CPT

## 2023-02-10 PROCEDURE — 86762 RUBELLA ANTIBODY: CPT

## 2023-02-10 PROCEDURE — G0432 EIA HIV-1/HIV-2 SCREEN: HCPCS

## 2023-02-10 PROCEDURE — 86780 TREPONEMA PALLIDUM: CPT

## 2023-02-10 PROCEDURE — 80306 DRUG TEST PRSMV INSTRMNT: CPT

## 2023-02-10 PROCEDURE — 82947 ASSAY GLUCOSE BLOOD QUANT: CPT

## 2023-02-10 PROCEDURE — 86900 BLOOD TYPING SEROLOGIC ABO: CPT

## 2023-02-10 PROCEDURE — 86803 HEPATITIS C AB TEST: CPT

## 2023-02-10 PROCEDURE — 86901 BLOOD TYPING SEROLOGIC RH(D): CPT

## 2023-02-10 PROCEDURE — 86850 RBC ANTIBODY SCREEN: CPT

## 2023-02-10 PROCEDURE — 36415 COLL VENOUS BLD VENIPUNCTURE: CPT

## 2023-02-10 PROCEDURE — 87086 URINE CULTURE/COLONY COUNT: CPT

## 2023-02-11 LAB
GLUCOSE SERPL-MCNC: 78 MG/DL (ref 65–99)
HCV AB SER DONR QL: NORMAL
HIV1+2 AB SER QL: NORMAL

## 2023-02-12 LAB
BACTERIA SPEC AEROBE CULT: NO GROWTH
RUBV IGG SERPL IA-ACNC: 5.2 INDEX

## 2023-02-14 LAB — TREPONEMA PALLIDUM IGG+IGM AB [PRESENCE] IN SERUM OR PLASMA BY IMMUNOASSAY: NON REACTIVE

## 2023-03-23 ENCOUNTER — TRANSCRIBE ORDERS (OUTPATIENT)
Dept: LAB | Facility: HOSPITAL | Age: 28
End: 2023-03-23
Payer: MEDICAID

## 2023-03-23 ENCOUNTER — LAB (OUTPATIENT)
Dept: LAB | Facility: HOSPITAL | Age: 28
End: 2023-03-23
Payer: MEDICAID

## 2023-03-23 DIAGNOSIS — Z34.83 PRENATAL CARE, SUBSEQUENT PREGNANCY, THIRD TRIMESTER: ICD-10-CM

## 2023-03-23 DIAGNOSIS — Z3A.28 28 WEEKS GESTATION OF PREGNANCY: ICD-10-CM

## 2023-03-23 DIAGNOSIS — Z3A.28 28 WEEKS GESTATION OF PREGNANCY: Primary | ICD-10-CM

## 2023-03-23 LAB
BLD GP AB SCN SERPL QL: NEGATIVE
GLUCOSE 1H P 100 G GLC PO SERPL-MCNC: 54 MG/DL (ref 65–139)

## 2023-03-23 PROCEDURE — 85027 COMPLETE CBC AUTOMATED: CPT

## 2023-03-23 PROCEDURE — 82950 GLUCOSE TEST: CPT

## 2023-03-23 PROCEDURE — 86850 RBC ANTIBODY SCREEN: CPT

## 2023-03-23 PROCEDURE — 36415 COLL VENOUS BLD VENIPUNCTURE: CPT

## 2023-03-24 LAB
DEPRECATED RDW RBC AUTO: 42.8 FL (ref 37–54)
ERYTHROCYTE [DISTWIDTH] IN BLOOD BY AUTOMATED COUNT: 12.8 % (ref 12.3–15.4)
HCT VFR BLD AUTO: 34.5 % (ref 34–46.6)
HGB BLD-MCNC: 11.6 G/DL (ref 12–15.9)
MCH RBC QN AUTO: 31.4 PG (ref 26.6–33)
MCHC RBC AUTO-ENTMCNC: 33.6 G/DL (ref 31.5–35.7)
MCV RBC AUTO: 93.5 FL (ref 79–97)
PLATELET # BLD AUTO: 238 10*3/MM3 (ref 140–450)
PMV BLD AUTO: 9.2 FL (ref 6–12)
RBC # BLD AUTO: 3.69 10*6/MM3 (ref 3.77–5.28)
WBC NRBC COR # BLD: 7.51 10*3/MM3 (ref 3.4–10.8)

## 2023-06-10 ENCOUNTER — HOSPITAL ENCOUNTER (INPATIENT)
Facility: HOSPITAL | Age: 28
LOS: 2 days | Discharge: HOME | End: 2023-06-12
Attending: OBSTETRICS & GYNECOLOGY | Admitting: OBSTETRICS & GYNECOLOGY
Payer: MEDICAID

## 2023-06-10 ENCOUNTER — ANESTHESIA EVENT (OUTPATIENT)
Dept: LABOR AND DELIVERY | Facility: HOSPITAL | Age: 28
End: 2023-06-10
Payer: MEDICAID

## 2023-06-10 ENCOUNTER — ANESTHESIA (OUTPATIENT)
Dept: LABOR AND DELIVERY | Facility: HOSPITAL | Age: 28
End: 2023-06-10
Payer: MEDICAID

## 2023-06-10 PROBLEM — Z37.9 NORMAL LABOR: Status: RESOLVED | Noted: 2023-06-10 | Resolved: 2023-06-10

## 2023-06-10 PROBLEM — Z37.9 NORMAL LABOR: Status: ACTIVE | Noted: 2023-06-10

## 2023-06-10 LAB
ABO GROUP BLD: NORMAL
ALP SERPL-CCNC: 126 U/L (ref 39–117)
ALT SERPL W P-5'-P-CCNC: 13 U/L (ref 1–33)
AST SERPL-CCNC: 20 U/L (ref 1–32)
BILIRUB SERPL-MCNC: 0.3 MG/DL (ref 0–1.2)
BLD GP AB SCN SERPL QL: NEGATIVE
CREAT SERPL-MCNC: 0.57 MG/DL (ref 0.57–1)
DEPRECATED RDW RBC AUTO: 43.8 FL (ref 37–54)
ERYTHROCYTE [DISTWIDTH] IN BLOOD BY AUTOMATED COUNT: 12.5 % (ref 12.3–15.4)
HCT VFR BLD AUTO: 37.5 % (ref 34–46.6)
HGB BLD-MCNC: 12.6 G/DL (ref 12–15.9)
LDH SERPL-CCNC: 168 U/L (ref 135–214)
MCH RBC QN AUTO: 32.5 PG (ref 26.6–33)
MCHC RBC AUTO-ENTMCNC: 33.6 G/DL (ref 31.5–35.7)
MCV RBC AUTO: 96.6 FL (ref 79–97)
PLATELET # BLD AUTO: 218 10*3/MM3 (ref 140–450)
PMV BLD AUTO: 9.5 FL (ref 6–12)
RBC # BLD AUTO: 3.88 10*6/MM3 (ref 3.77–5.28)
RH BLD: POSITIVE
T&S EXPIRATION DATE: NORMAL
URATE SERPL-MCNC: 4.9 MG/DL (ref 2.4–5.7)
WBC NRBC COR # BLD: 10.86 10*3/MM3 (ref 3.4–10.8)

## 2023-06-10 PROCEDURE — 84075 ASSAY ALKALINE PHOSPHATASE: CPT | Performed by: OBSTETRICS & GYNECOLOGY

## 2023-06-10 PROCEDURE — 25010000002 ROPIVACAINE PER 1 MG: Performed by: ANESTHESIOLOGY

## 2023-06-10 PROCEDURE — 86850 RBC ANTIBODY SCREEN: CPT | Performed by: OBSTETRICS & GYNECOLOGY

## 2023-06-10 PROCEDURE — 86901 BLOOD TYPING SEROLOGIC RH(D): CPT | Performed by: OBSTETRICS & GYNECOLOGY

## 2023-06-10 PROCEDURE — 82247 BILIRUBIN TOTAL: CPT | Performed by: OBSTETRICS & GYNECOLOGY

## 2023-06-10 PROCEDURE — 86900 BLOOD TYPING SEROLOGIC ABO: CPT | Performed by: OBSTETRICS & GYNECOLOGY

## 2023-06-10 PROCEDURE — 84460 ALANINE AMINO (ALT) (SGPT): CPT | Performed by: OBSTETRICS & GYNECOLOGY

## 2023-06-10 PROCEDURE — 25010000002 PENICILLIN G POTASSIUM PER 600000 UNITS: Performed by: OBSTETRICS & GYNECOLOGY

## 2023-06-10 PROCEDURE — 82565 ASSAY OF CREATININE: CPT | Performed by: OBSTETRICS & GYNECOLOGY

## 2023-06-10 PROCEDURE — 84450 TRANSFERASE (AST) (SGOT): CPT | Performed by: OBSTETRICS & GYNECOLOGY

## 2023-06-10 PROCEDURE — 83615 LACTATE (LD) (LDH) ENZYME: CPT | Performed by: OBSTETRICS & GYNECOLOGY

## 2023-06-10 PROCEDURE — C1755 CATHETER, INTRASPINAL: HCPCS

## 2023-06-10 PROCEDURE — 51702 INSERT TEMP BLADDER CATH: CPT

## 2023-06-10 PROCEDURE — 59025 FETAL NON-STRESS TEST: CPT

## 2023-06-10 PROCEDURE — C1755 CATHETER, INTRASPINAL: HCPCS | Performed by: ANESTHESIOLOGY

## 2023-06-10 PROCEDURE — 85027 COMPLETE CBC AUTOMATED: CPT | Performed by: OBSTETRICS & GYNECOLOGY

## 2023-06-10 PROCEDURE — 25010000002 FENTANYL CITRATE (PF) 50 MCG/ML SOLUTION: Performed by: ANESTHESIOLOGY

## 2023-06-10 PROCEDURE — 84550 ASSAY OF BLOOD/URIC ACID: CPT | Performed by: OBSTETRICS & GYNECOLOGY

## 2023-06-10 RX ORDER — ONDANSETRON 2 MG/ML
4 INJECTION INTRAMUSCULAR; INTRAVENOUS ONCE AS NEEDED
Status: DISCONTINUED | OUTPATIENT
Start: 2023-06-10 | End: 2023-06-10 | Stop reason: HOSPADM

## 2023-06-10 RX ORDER — SODIUM CHLORIDE 0.9 % (FLUSH) 0.9 %
10 SYRINGE (ML) INJECTION AS NEEDED
Status: DISCONTINUED | OUTPATIENT
Start: 2023-06-10 | End: 2023-06-10 | Stop reason: HOSPADM

## 2023-06-10 RX ORDER — EPHEDRINE SULFATE 5 MG/ML
INJECTION INTRAVENOUS
Status: DISCONTINUED
Start: 2023-06-10 | End: 2023-06-12 | Stop reason: HOSPADM

## 2023-06-10 RX ORDER — SODIUM CHLORIDE, SODIUM LACTATE, POTASSIUM CHLORIDE, CALCIUM CHLORIDE 600; 310; 30; 20 MG/100ML; MG/100ML; MG/100ML; MG/100ML
125 INJECTION, SOLUTION INTRAVENOUS AS NEEDED
Status: DISCONTINUED | OUTPATIENT
Start: 2023-06-10 | End: 2023-06-10

## 2023-06-10 RX ORDER — IBUPROFEN 600 MG/1
600 TABLET ORAL EVERY 6 HOURS PRN
Status: DISCONTINUED | OUTPATIENT
Start: 2023-06-10 | End: 2023-06-12 | Stop reason: HOSPADM

## 2023-06-10 RX ORDER — HYDROCODONE BITARTRATE AND ACETAMINOPHEN 10; 325 MG/1; MG/1
1 TABLET ORAL EVERY 4 HOURS PRN
Status: DISCONTINUED | OUTPATIENT
Start: 2023-06-10 | End: 2023-06-12 | Stop reason: HOSPADM

## 2023-06-10 RX ORDER — DIPHENHYDRAMINE HYDROCHLORIDE 50 MG/ML
12.5 INJECTION INTRAMUSCULAR; INTRAVENOUS EVERY 8 HOURS PRN
Status: DISCONTINUED | OUTPATIENT
Start: 2023-06-10 | End: 2023-06-10 | Stop reason: HOSPADM

## 2023-06-10 RX ORDER — PENICILLIN G 3000000 [IU]/50ML
3 INJECTION, SOLUTION INTRAVENOUS EVERY 4 HOURS
Status: DISCONTINUED | OUTPATIENT
Start: 2023-06-10 | End: 2023-06-10

## 2023-06-10 RX ORDER — POLYETHYLENE GLYCOL 3350 17 G/17G
17 POWDER, FOR SOLUTION ORAL DAILY PRN
Status: DISCONTINUED | OUTPATIENT
Start: 2023-06-10 | End: 2023-06-12 | Stop reason: HOSPADM

## 2023-06-10 RX ORDER — ONDANSETRON 2 MG/ML
4 INJECTION INTRAMUSCULAR; INTRAVENOUS EVERY 6 HOURS PRN
Status: DISCONTINUED | OUTPATIENT
Start: 2023-06-10 | End: 2023-06-12 | Stop reason: HOSPADM

## 2023-06-10 RX ORDER — METHYLERGONOVINE MALEATE 0.2 MG/ML
200 INJECTION INTRAVENOUS ONCE AS NEEDED
Status: DISCONTINUED | OUTPATIENT
Start: 2023-06-10 | End: 2023-06-10 | Stop reason: HOSPADM

## 2023-06-10 RX ORDER — OXYTOCIN 10 [USP'U]/ML
10 INJECTION, SOLUTION INTRAMUSCULAR; INTRAVENOUS ONCE
Status: DISCONTINUED | OUTPATIENT
Start: 2023-06-10 | End: 2023-06-10 | Stop reason: HOSPADM

## 2023-06-10 RX ORDER — ONDANSETRON 4 MG/1
4 TABLET, FILM COATED ORAL EVERY 6 HOURS PRN
Status: DISCONTINUED | OUTPATIENT
Start: 2023-06-10 | End: 2023-06-10 | Stop reason: HOSPADM

## 2023-06-10 RX ORDER — BISACODYL 10 MG
10 SUPPOSITORY, RECTAL RECTAL DAILY PRN
Status: DISCONTINUED | OUTPATIENT
Start: 2023-06-11 | End: 2023-06-12 | Stop reason: HOSPADM

## 2023-06-10 RX ORDER — HYDROCORTISONE 25 MG/G
1 CREAM TOPICAL AS NEEDED
Status: DISCONTINUED | OUTPATIENT
Start: 2023-06-10 | End: 2023-06-12 | Stop reason: HOSPADM

## 2023-06-10 RX ORDER — METOCLOPRAMIDE HYDROCHLORIDE 5 MG/ML
10 INJECTION INTRAMUSCULAR; INTRAVENOUS ONCE AS NEEDED
Status: DISCONTINUED | OUTPATIENT
Start: 2023-06-10 | End: 2023-06-10 | Stop reason: HOSPADM

## 2023-06-10 RX ORDER — OXYTOCIN/0.9 % SODIUM CHLORIDE 30/500 ML
999 PLASTIC BAG, INJECTION (ML) INTRAVENOUS ONCE
Status: COMPLETED | OUTPATIENT
Start: 2023-06-10 | End: 2023-06-10

## 2023-06-10 RX ORDER — LIDOCAINE HYDROCHLORIDE 10 MG/ML
5 INJECTION, SOLUTION EPIDURAL; INFILTRATION; INTRACAUDAL; PERINEURAL AS NEEDED
Status: DISCONTINUED | OUTPATIENT
Start: 2023-06-10 | End: 2023-06-10 | Stop reason: HOSPADM

## 2023-06-10 RX ORDER — ACETAMINOPHEN 325 MG/1
650 TABLET ORAL EVERY 6 HOURS PRN
Status: DISCONTINUED | OUTPATIENT
Start: 2023-06-10 | End: 2023-06-12 | Stop reason: HOSPADM

## 2023-06-10 RX ORDER — OXYTOCIN/0.9 % SODIUM CHLORIDE 30/500 ML
250 PLASTIC BAG, INJECTION (ML) INTRAVENOUS CONTINUOUS
Status: ACTIVE | OUTPATIENT
Start: 2023-06-10 | End: 2023-06-10

## 2023-06-10 RX ORDER — EPHEDRINE SULFATE 5 MG/ML
10 INJECTION INTRAVENOUS
Status: DISCONTINUED | OUTPATIENT
Start: 2023-06-10 | End: 2023-06-10 | Stop reason: HOSPADM

## 2023-06-10 RX ORDER — MISOPROSTOL 200 UG/1
800 TABLET ORAL AS NEEDED
Status: DISCONTINUED | OUTPATIENT
Start: 2023-06-10 | End: 2023-06-10 | Stop reason: HOSPADM

## 2023-06-10 RX ORDER — ONDANSETRON 2 MG/ML
4 INJECTION INTRAMUSCULAR; INTRAVENOUS EVERY 6 HOURS PRN
Status: DISCONTINUED | OUTPATIENT
Start: 2023-06-10 | End: 2023-06-10 | Stop reason: HOSPADM

## 2023-06-10 RX ORDER — SODIUM CHLORIDE, SODIUM LACTATE, POTASSIUM CHLORIDE, CALCIUM CHLORIDE 600; 310; 30; 20 MG/100ML; MG/100ML; MG/100ML; MG/100ML
125 INJECTION, SOLUTION INTRAVENOUS CONTINUOUS
Status: DISCONTINUED | OUTPATIENT
Start: 2023-06-10 | End: 2023-06-11

## 2023-06-10 RX ORDER — PRENATAL VIT/IRON FUM/FOLIC AC 27MG-0.8MG
1 TABLET ORAL DAILY
Status: DISCONTINUED | OUTPATIENT
Start: 2023-06-11 | End: 2023-06-12 | Stop reason: HOSPADM

## 2023-06-10 RX ORDER — SIMETHICONE 80 MG
80 TABLET,CHEWABLE ORAL 4 TIMES DAILY PRN
Status: DISCONTINUED | OUTPATIENT
Start: 2023-06-10 | End: 2023-06-12 | Stop reason: HOSPADM

## 2023-06-10 RX ORDER — FAMOTIDINE 10 MG/ML
20 INJECTION, SOLUTION INTRAVENOUS ONCE AS NEEDED
Status: DISCONTINUED | OUTPATIENT
Start: 2023-06-10 | End: 2023-06-10 | Stop reason: HOSPADM

## 2023-06-10 RX ORDER — OXYTOCIN/0.9 % SODIUM CHLORIDE 30/500 ML
PLASTIC BAG, INJECTION (ML) INTRAVENOUS
Status: COMPLETED
Start: 2023-06-10 | End: 2023-06-10

## 2023-06-10 RX ORDER — SODIUM CHLORIDE 0.9 % (FLUSH) 0.9 %
3 SYRINGE (ML) INJECTION EVERY 12 HOURS SCHEDULED
Status: DISCONTINUED | OUTPATIENT
Start: 2023-06-10 | End: 2023-06-10 | Stop reason: HOSPADM

## 2023-06-10 RX ORDER — SODIUM CHLORIDE 0.9 % (FLUSH) 0.9 %
1-10 SYRINGE (ML) INJECTION AS NEEDED
Status: DISCONTINUED | OUTPATIENT
Start: 2023-06-10 | End: 2023-06-12 | Stop reason: HOSPADM

## 2023-06-10 RX ORDER — DOCUSATE SODIUM 100 MG/1
100 CAPSULE, LIQUID FILLED ORAL 2 TIMES DAILY
Status: DISCONTINUED | OUTPATIENT
Start: 2023-06-11 | End: 2023-06-12 | Stop reason: HOSPADM

## 2023-06-10 RX ORDER — ONDANSETRON 4 MG/1
4 TABLET, FILM COATED ORAL EVERY 6 HOURS PRN
Status: DISCONTINUED | OUTPATIENT
Start: 2023-06-10 | End: 2023-06-12 | Stop reason: HOSPADM

## 2023-06-10 RX ORDER — OXYTOCIN/0.9 % SODIUM CHLORIDE 30/500 ML
2-20 PLASTIC BAG, INJECTION (ML) INTRAVENOUS
Status: DISCONTINUED | OUTPATIENT
Start: 2023-06-10 | End: 2023-06-11

## 2023-06-10 RX ORDER — BUPIVACAINE HYDROCHLORIDE 2.5 MG/ML
INJECTION, SOLUTION EPIDURAL; INFILTRATION; INTRACAUDAL AS NEEDED
Status: DISCONTINUED | OUTPATIENT
Start: 2023-06-10 | End: 2023-06-10 | Stop reason: SURG

## 2023-06-10 RX ORDER — ACETAMINOPHEN 500 MG
1000 TABLET ORAL ONCE
Status: COMPLETED | OUTPATIENT
Start: 2023-06-10 | End: 2023-06-10

## 2023-06-10 RX ORDER — ROPIVACAINE HYDROCHLORIDE 2 MG/ML
15 INJECTION, SOLUTION EPIDURAL; INFILTRATION; PERINEURAL CONTINUOUS
Status: DISCONTINUED | OUTPATIENT
Start: 2023-06-10 | End: 2023-06-11

## 2023-06-10 RX ORDER — LIDOCAINE HYDROCHLORIDE AND EPINEPHRINE 15; 5 MG/ML; UG/ML
INJECTION, SOLUTION EPIDURAL AS NEEDED
Status: DISCONTINUED | OUTPATIENT
Start: 2023-06-10 | End: 2023-06-10 | Stop reason: SURG

## 2023-06-10 RX ORDER — MAGNESIUM CARB/ALUMINUM HYDROX 105-160MG
30 TABLET,CHEWABLE ORAL ONCE
Status: DISCONTINUED | OUTPATIENT
Start: 2023-06-10 | End: 2023-06-10 | Stop reason: HOSPADM

## 2023-06-10 RX ORDER — CARBOPROST TROMETHAMINE 250 UG/ML
250 INJECTION, SOLUTION INTRAMUSCULAR AS NEEDED
Status: DISCONTINUED | OUTPATIENT
Start: 2023-06-10 | End: 2023-06-10 | Stop reason: HOSPADM

## 2023-06-10 RX ORDER — HYDROCODONE BITARTRATE AND ACETAMINOPHEN 5; 325 MG/1; MG/1
1 TABLET ORAL EVERY 4 HOURS PRN
Status: DISCONTINUED | OUTPATIENT
Start: 2023-06-10 | End: 2023-06-12 | Stop reason: HOSPADM

## 2023-06-10 RX ORDER — TRISODIUM CITRATE DIHYDRATE AND CITRIC ACID MONOHYDRATE 500; 334 MG/5ML; MG/5ML
30 SOLUTION ORAL ONCE
Status: DISCONTINUED | OUTPATIENT
Start: 2023-06-10 | End: 2023-06-10 | Stop reason: HOSPADM

## 2023-06-10 RX ORDER — FENTANYL CITRATE 50 UG/ML
INJECTION, SOLUTION INTRAMUSCULAR; INTRAVENOUS AS NEEDED
Status: DISCONTINUED | OUTPATIENT
Start: 2023-06-10 | End: 2023-06-10 | Stop reason: SURG

## 2023-06-10 RX ADMIN — SODIUM CHLORIDE 5 MILLION UNITS: 900 INJECTION INTRAVENOUS at 06:50

## 2023-06-10 RX ADMIN — Medication 2 MILLI-UNITS/MIN: at 08:59

## 2023-06-10 RX ADMIN — SODIUM CHLORIDE, POTASSIUM CHLORIDE, SODIUM LACTATE AND CALCIUM CHLORIDE 125 ML/HR: 600; 310; 30; 20 INJECTION, SOLUTION INTRAVENOUS at 14:17

## 2023-06-10 RX ADMIN — FENTANYL CITRATE 100 MCG: 50 INJECTION, SOLUTION INTRAMUSCULAR; INTRAVENOUS at 13:53

## 2023-06-10 RX ADMIN — PENICILLIN G 3 MILLION UNITS: 3000000 INJECTION, SOLUTION INTRAVENOUS at 14:17

## 2023-06-10 RX ADMIN — LIDOCAINE HYDROCHLORIDE AND EPINEPHRINE 2 ML: 15; 5 INJECTION, SOLUTION EPIDURAL at 13:52

## 2023-06-10 RX ADMIN — Medication 999 ML/HR: at 19:59

## 2023-06-10 RX ADMIN — ACETAMINOPHEN 1000 MG: 500 TABLET ORAL at 20:35

## 2023-06-10 RX ADMIN — SODIUM CHLORIDE, POTASSIUM CHLORIDE, SODIUM LACTATE AND CALCIUM CHLORIDE 125 ML/HR: 600; 310; 30; 20 INJECTION, SOLUTION INTRAVENOUS at 06:42

## 2023-06-10 RX ADMIN — ROPIVACAINE HYDROCHLORIDE 15 ML/HR: 2 INJECTION, SOLUTION EPIDURAL; INFILTRATION at 13:58

## 2023-06-10 RX ADMIN — LIDOCAINE HYDROCHLORIDE AND EPINEPHRINE 3 ML: 15; 5 INJECTION, SOLUTION EPIDURAL at 13:51

## 2023-06-10 RX ADMIN — PENICILLIN G 3 MILLION UNITS: 3000000 INJECTION, SOLUTION INTRAVENOUS at 18:52

## 2023-06-10 RX ADMIN — PENICILLIN G 3 MILLION UNITS: 3000000 INJECTION, SOLUTION INTRAVENOUS at 11:20

## 2023-06-10 RX ADMIN — SODIUM CHLORIDE, POTASSIUM CHLORIDE, SODIUM LACTATE AND CALCIUM CHLORIDE 1000 ML: 600; 310; 30; 20 INJECTION, SOLUTION INTRAVENOUS at 13:33

## 2023-06-10 RX ADMIN — BUPIVACAINE HYDROCHLORIDE 12 ML: 2.5 INJECTION, SOLUTION EPIDURAL; INFILTRATION; INTRACAUDAL; PERINEURAL at 13:54

## 2023-06-10 NOTE — PROGRESS NOTES
Vane Maguire  [id number]  1995      Pt presents with SROM.  Pt has refused IV or any meds.  Pt is GBS positive.  I discussed with pt reason for IV antibiotics (risk of baby getting infection or sepsis with GBS, which could cause significant damage to baby or even death if sepsis develops).  Recommended saline lock for IV PCN and in case of emergency.  Pt will consider and let nurse know.    Ranjith Rodrigues MD  6/10/2023  05:55 EDT

## 2023-06-10 NOTE — ANESTHESIA PREPROCEDURE EVALUATION
Anesthesia Evaluation     Patient summary reviewed and Nursing notes reviewed                Airway   Mallampati: II  TM distance: >3 FB  Neck ROM: full  No difficulty expected  Dental      Pulmonary - negative pulmonary ROS   Cardiovascular - negative cardio ROS        Neuro/Psych- negative ROS  GI/Hepatic/Renal/Endo - negative ROS     Musculoskeletal (-) negative ROS    Abdominal    Substance History - negative use     OB/GYN negative ob/gyn ROS   (+) Pregnant        Other                      Anesthesia Plan    ASA 2     epidural       Anesthetic plan, risks, benefits, and alternatives have been provided, discussed and informed consent has been obtained with: patient.    Use of blood products discussed with patient .      CODE STATUS:    Level Of Support Discussed With: Patient  Code Status (Patient has no pulse and is not breathing): CPR (Attempt to Resuscitate)  Medical Interventions (Patient has pulse or is breathing): Full Support  Release to patient: Routine Release

## 2023-06-10 NOTE — H&P
[place]  Obstetric History and Physical    Chief Complaint   Patient presents with    Laboring       Subjective     Patient is a 28 y.o. female  currently at 40w3d, who presents with SROM around 0100 today. She continues to leak fluid. She states that her ctx are not very strong.     Her prenatal care has been uncomplicated.  Her previous obstetric/gynecological history is noted and is remarkable for prior FTSVD x 1, had GDM with prior pregnancy but passed GCT with this pregnancy (55).    The following portions of the patients history were reviewed and updated as appropriate: current medications, allergies, past medical history, past surgical history, past family history, past social history, and problem list .       Prenatal Information:   Maternal Prenatal Labs  Blood Type No results found for: ABO   Rh Status No results found for: RH   Antibody Screen No results found for: ABSCRN   Gonnorhea No results found for: GCCX   Chlamydia No results found for: CLAMYDCU   RPR No results found for: RPR   Syphilis Antibody No results found for: SYPHILIS   Rubella No results found for: RUBELLAIGGIN   Hepatitis B Surface Antigen No results found for: HEPBSAG   HIV-1 Antibody No results found for: LABHIV1   Hepatitis C Antibody No results found for: HEPCAB   Rapid Urin Drug Screen No results found for: AMPMETHU, BARBITSCNUR, LABBENZSCN, LABMETHSCN, LABOPIASCN, THCURSCR, COCAINEUR, AMPHETSCREEN, PROPOXSCN, BUPRENORSCNU, METAMPSCNUR, OXYCODONESCN, TRICYCLICSCN   Group B Strep Culture No results found for: GBSANTIGEN           External Prenatal Results       Pregnancy Outside Results - Transcribed From Office Records - See Scanned Records For Details       Test Value Date Time    ABO  O  02/10/23 1621    Rh  Positive  02/10/23 1621    Antibody Screen  Negative  23 1154       Negative  02/10/23 1621    Varicella IgG       Rubella  5.20 index 02/10/23 1621    Hgb  12.6 g/dL 06/10/23 0642       11.6 g/dL 23  1154       11.7 g/dL 02/10/23 1621    Hct  37.5 % 06/10/23 0642       34.5 % 23 1154       34.0 % 02/10/23 1621    Glucose Fasting GTT       Glucose Tolerance Test 1 hour       Glucose Tolerance Test 3 hour       Gonorrhea (discrete)       Chlamydia (discrete)       RPR  Non-Reactive  22 1655    VDRL       Syphilis Antibody       HBsAg  Non-Reactive  22 1655    Herpes Simplex Virus PCR       Herpes Simplex VIrus Culture       HIV  Non-Reactive  02/10/23 1621    Hep C RNA Quant PCR       Hep C Antibody  Non-Reactive  02/10/23 1621    AFP       Group B Strep       GBS Susceptibility to Clindamycin       GBS Susceptibility to Erythromycin       Fetal Fibronectin       Genetic Testing, Maternal Blood                 Drug Screening       Test Value Date Time    Urine Drug Screen       Amphetamine Screen  Negative  02/10/23 1621    Barbiturate Screen  Negative  02/10/23 1621    Benzodiazepine Screen  Negative  02/10/23 1621    Methadone Screen  Negative  02/10/23 1621    Phencyclidine Screen  Negative  02/10/23 1621    Opiates Screen  Negative  02/10/23 1621    THC Screen  Negative  02/10/23 1621    Cocaine Screen       Propoxyphene Screen  Negative  02/10/23 1621    Buprenorphine Screen  Negative  02/10/23 1621    Methamphetamine Screen       Oxycodone Screen  Negative  02/10/23 1621    Tricyclic Antidepressants Screen  Negative  02/10/23 1621              Legend    ^: Historical                              Past OB History:       OB History    Para Term  AB Living   2 1 1 0 0 1   SAB IAB Ectopic Molar Multiple Live Births   0 0 0 0 0 1      # Outcome Date GA Lbr Hayes/2nd Weight Sex Delivery Anes PTL Lv   2 Current            1 Term 22 40w5d  3120 g (6 lb 14.1 oz) F Vag-Spont None N YUMIKO      Name: ENRIQUETA CALDERÓN      Apgar1: 7  Apgar5: 8       Past Medical History: Past Medical History:   Diagnosis Date    Gestational diabetes     with first pregnancy, not the second       Past Surgical History Past Surgical History:   Procedure Laterality Date    LASIK        Family History: Family History   Problem Relation Age of Onset    Hypertension Father       Social History:  reports that she has never smoked. She has never used smokeless tobacco.   reports no history of alcohol use.   reports no history of drug use.                   General ROS Negative Findings:Headaches, Visual Changes, Epigastric pain, Anorexia, Nausia/Vomiting, ROM, and Vaginal Bleeding    ROS      Objective       Vital Signs Range for the last 24 hours  Temperature: Temp:  [98 °F (36.7 °C)-98.4 °F (36.9 °C)] 98 °F (36.7 °C)   Temp Source: Temp src: Oral   BP: BP: (112-119)/(63-71) 116/69   Pulse: Heart Rate:  [70-75] 71   Respirations: Resp:  [16-18] 18   SPO2:     O2 Amount (l/min):     O2 Devices Device (Oxygen Therapy): room air   Weight: Weight:  [72.6 kg (160 lb)] 72.6 kg (160 lb)     Physical Examination:   General:   alert, appears stated age, and cooperative   Skin:   normal   HEENT:  normal   Lungs:   Normal respiratory effort, no respiratory distress   Heart:   Regular rate   Abdomen:  Soft, gravid, nontender   Lower Extremeties  no edema   Pelvis:  Exam deferred         Presentation: vtx   Cervix: Exam by: MD   Dilation:  2.5cm   Effacement: Cervical Effacement: 70-80%   Station:  -2       Fetal Heart Rate Assessment   Method: Fetal HR Assessment Method: Telemetry/Wireless Fetal HR Monitor   Beats/min: Fetal HR (beats/min): 135   Baseline: Fetal HR Baseline: normal range   Varibility: Fetal HR Variability: moderate (amplitude range 6 to 25 bpm)   Accels: Fetal HR Accelerations: greater than/equal to 15 bpm, lasting at least 15 seconds   Decels: Fetal HR Decelerations: absent   Tracing Category:  I     Uterine Assessment   Method: Method: Telemetry/Wireless Uterine Activity Monitor   Frequency (min): Contraction Frequency (Minutes): 2.5-4.5   Ctx Count in 10 min:     Duration:     Intensity:     Intensity by  IUPC:     Resting Tone: Uterine Resting Tone: soft by palpation   Resting Tone by IUPC:     [place] Units:       Laboratory Results:   Lab Results (last 24 hours)       Procedure Component Value Units Date/Time    Preeclampsia Panel [[id number]]  (Abnormal) Collected: 06/10/23 0642    Specimen: Blood Updated: 06/10/23 0717     Alkaline Phosphatase 126 U/L      ALT (SGPT) 13 U/L      AST (SGOT) 20 U/L      Creatinine 0.57 mg/dL      Total Bilirubin 0.3 mg/dL       U/L      Comment: Specimen hemolyzed.  Results may be affected.        Uric Acid 4.9 mg/dL     CBC (No Diff) [[id number]]  (Abnormal) Collected: 06/10/23 0642    Specimen: Blood Updated: 06/10/23 0656     WBC 10.86 10*3/mm3      RBC 3.88 10*6/mm3      Hemoglobin 12.6 g/dL      Hematocrit 37.5 %      MCV 96.6 fL      MCH 32.5 pg      MCHC 33.6 g/dL      RDW 12.5 %      RDW-SD 43.8 fl      MPV 9.5 fL      Platelets 218 10*3/mm3           Radiology Review:  Imaging Results (Last 24 Hours)       ** No results found for the last 24 hours. **          Other Studies:    Assessment & Plan       Normal labor        Assessment:  1.  Intrauterine pregnancy at 40w3d weeks gestation with reassuring fetal status.    2.  PROM - meconium stained fluid  3.  GBS positive    Plan:  1. Admit to LD; was initially managed expectantly, however no change in SVE after >4h and pt reports ctx are mild, will augment with pitocin per protocol; PCN for GBS ppx; plans unmedicated labor and delivery, may have analgesics and/or epidural if desires  2. Plan of care has been reviewed with patient.  3.  Risks, benefits of treatment plan have been discussed.  4.  All questions have been answered.        Samantha Azevedo MD  6/10/2023  08:51 EDT

## 2023-06-10 NOTE — ANESTHESIA PROCEDURE NOTES
Labor Epidural      Patient reassessed immediately prior to procedure    Patient location during procedure: OB  Performed By  Anesthesiologist: Gerardo Navarrete DO  Preanesthetic Checklist  Completed: patient identified, IV checked, site marked, risks and benefits discussed, surgical consent, monitors and equipment checked, pre-op evaluation and timeout performed  Additional Notes  Dural puncture performed with a 5 inch 25 g Dick needle, clear CSF.  Prep:  Pt Position:sitting  Sterile Tech:gloves, mask, sterile barrier and cap  Prep:chlorhexidine gluconate and isopropyl alcohol  Monitoring:blood pressure monitoring and continuous pulse oximetry  Epidural Block Procedure:  Approach:midline  Guidance:landmark technique and palpation technique  Location:L3-L4  Needle Type:Tuohy  Needle Gauge:17 G  Loss of Resistance Medium: air  Loss of Resistance: 5cm  Cath Depth at skin:11 cm  Paresthesia: none  Aspiration:negative  Test Dose:negative  Number of Attempts: 1  Post Assessment:  Dressing:secured with tape and occlusive dressing applied (Tegaderm Placed)  Pt Tolerance:patient tolerated the procedure well with no apparent complications  Complications:no

## 2023-06-11 LAB
BASOPHILS # BLD AUTO: 0.04 10*3/MM3 (ref 0–0.2)
BASOPHILS NFR BLD AUTO: 0.2 % (ref 0–1.5)
DEPRECATED RDW RBC AUTO: 44.3 FL (ref 37–54)
EOSINOPHIL # BLD AUTO: 0.08 10*3/MM3 (ref 0–0.4)
EOSINOPHIL NFR BLD AUTO: 0.4 % (ref 0.3–6.2)
ERYTHROCYTE [DISTWIDTH] IN BLOOD BY AUTOMATED COUNT: 12.3 % (ref 12.3–15.4)
HCT VFR BLD AUTO: 32.6 % (ref 34–46.6)
HCT VFR BLD AUTO: 33 % (ref 34–46.6)
HGB BLD-MCNC: 10.9 G/DL (ref 12–15.9)
HGB BLD-MCNC: 11 G/DL (ref 12–15.9)
IMM GRANULOCYTES # BLD AUTO: 0.08 10*3/MM3 (ref 0–0.05)
IMM GRANULOCYTES NFR BLD AUTO: 0.4 % (ref 0–0.5)
LYMPHOCYTES # BLD AUTO: 2.26 10*3/MM3 (ref 0.7–3.1)
LYMPHOCYTES NFR BLD AUTO: 10.8 % (ref 19.6–45.3)
MCH RBC QN AUTO: 32.4 PG (ref 26.6–33)
MCHC RBC AUTO-ENTMCNC: 33.3 G/DL (ref 31.5–35.7)
MCV RBC AUTO: 97.3 FL (ref 79–97)
MONOCYTES # BLD AUTO: 1.68 10*3/MM3 (ref 0.1–0.9)
MONOCYTES NFR BLD AUTO: 8 % (ref 5–12)
NEUTROPHILS NFR BLD AUTO: 16.78 10*3/MM3 (ref 1.7–7)
NEUTROPHILS NFR BLD AUTO: 80.2 % (ref 42.7–76)
NRBC BLD AUTO-RTO: 0 /100 WBC (ref 0–0.2)
PLATELET # BLD AUTO: 209 10*3/MM3 (ref 140–450)
PMV BLD AUTO: 9.8 FL (ref 6–12)
RBC # BLD AUTO: 3.39 10*6/MM3 (ref 3.77–5.28)
WBC NRBC COR # BLD: 20.92 10*3/MM3 (ref 3.4–10.8)

## 2023-06-11 PROCEDURE — 85018 HEMOGLOBIN: CPT | Performed by: OBSTETRICS & GYNECOLOGY

## 2023-06-11 PROCEDURE — 85014 HEMATOCRIT: CPT | Performed by: OBSTETRICS & GYNECOLOGY

## 2023-06-11 PROCEDURE — 85025 COMPLETE CBC W/AUTO DIFF WBC: CPT | Performed by: ADVANCED PRACTICE MIDWIFE

## 2023-06-11 RX ADMIN — DOCUSATE SODIUM 100 MG: 100 CAPSULE, LIQUID FILLED ORAL at 20:52

## 2023-06-11 RX ADMIN — DOCUSATE SODIUM 100 MG: 100 CAPSULE, LIQUID FILLED ORAL at 00:05

## 2023-06-11 RX ADMIN — PRENATAL VITAMINS-IRON FUMARATE 27 MG IRON-FOLIC ACID 0.8 MG TABLET 1 TABLET: at 08:42

## 2023-06-11 RX ADMIN — DOCUSATE SODIUM 100 MG: 100 CAPSULE, LIQUID FILLED ORAL at 08:42

## 2023-06-11 RX ADMIN — IBUPROFEN 600 MG: 600 TABLET, FILM COATED ORAL at 17:29

## 2023-06-11 RX ADMIN — Medication 1 APPLICATION: at 00:05

## 2023-06-11 RX ADMIN — WITCH HAZEL 1 PAD: 500 SOLUTION RECTAL; TOPICAL at 00:05

## 2023-06-11 RX ADMIN — Medication: at 00:05

## 2023-06-11 NOTE — L&D DELIVERY NOTE
[facility]   Vaginal Delivery Note    Patient Name: Vane Maguire  : 1995  MRN: [id number]    Date of Delivery: 6/10/2023     Diagnosis     Pre & Post-Delivery:  Intrauterine pregnancy at 40w3d  Labor status: Spontaneous Onset of Labor     Normal labor             Problem List    Transfer to Postpartum     Review the Delivery Report for details.     Delivery     Delivery: Vaginal, Spontaneous    YOB: 2022    Time of Birth:  Gestational Age 7:55 PM   40w3d     Anesthesia: Epidural     Delivering clinician: Samantha Azevedo    Forceps?   No   Vacuum? No    Shoulder dystocia present: No        Delivery narrative:  Patient developed a fever of 100.6F just before pushing. FHR tracing reviewed, deep variables with moderate variability and recovery in between variables noted, no fetal tachycardia prior to delivery. Patient went on to complete an uncomplicated  at 40w3d over a first degree laceration. Anterior shoulder delivered with ease. Infant initially vigorous at delivery so placed on maternal abdomen. Delayed cord clamping x 1 min. Cord doubly clamped and cut by father. Infant handed off to delivery room nurse for additional stimulation. DRT then called to room due to  tachycardia and fever. Cord blood and cord segment obtained. Placenta delivered spontaneously and appeared intact. Laceration repaired in standard fashion using 3-0 vicryl. Careful inspection of the vagina and perineum revealed only superficial abrasions which were hemostatic and did not require repair. Uterus became boggy with moderate bleeding. Clots were manually evacuated and uterine tone improved with fundal massage and IV pitocin administration. Patient was agreeable to PO anti-pyretic, but declines IV antibiotics at this time. GBS status was positive, was adequately treated with PCN throughout labor.        Infant     Findings: female  infant     Infant observations: Weight: No birth weight on file.    Length:   in  Observations/Comments:        Apgars: 8  @ 1 minute /    8  @ 5 minutes   Infant Name: Undecided     Placenta & Cord         Placenta delivered  Spontaneous  at   6/10/2023  7:59 PM     Cord: 3 vessels  present.   Nuchal Cord?  no   Cord blood obtained: Yes    Cord gases obtained:  No   Cord gas results: Venous:  No results found for: PHCVEN    Arterial:  No results found for: PHCART     Repair     Episiotomy: Not recorded     No    Lacerations: Yes  Laceration Information  Laceration Repaired?   Perineal:  first  yes   Periurethral:       Labial:       Sulcus:       Vaginal:       Cervical:         Suture used for repair: 3-0 Vicryl   Estimated Blood Loss: 700cc     Complications     maternal temperature    Disposition     Mother to Mother Baby/Postpartum  in stable condition currently.  Baby to NICU  in stable condition currently.    Samantha Azevedo MD  06/10/23  20:28 EDT

## 2023-06-11 NOTE — PROGRESS NOTES
[facility]  Vaginal Delivery Progress Note    Subjective     Doing well, pain controlled, lochia less than menses, voiding without difficulty. Breastfeeding initiated      Objective     Vital Signs Range for the last 24 hours  Temperature: Temp:  [97.5 °F (36.4 °C)-102.3 °F (39.1 °C)] 97.7 °F (36.5 °C)   Temp Source: Temp src: Oral   BP: BP: (101-159)/(54-82) 101/56   Pulse: Heart Rate:  [] 84   Respirations: Resp:  [14-16] 14   SPO2: SpO2:  [99 %-100 %] 100 %   O2 Amount (l/min):     O2 Devices Device (Oxygen Therapy): room air         Physical Exam:  General:  no acute distresss.  Abdomen: Soft, non-tender, fundus firm  Lochia: less than a normal period,  Perineum: not inspected  Extremities: normal, atraumatic, no cyanosis, and trace edema.       Lab results reviewed:  Yes    Lab Results   Component Value Date    WBC 20.92 (H) 2023    HGB 10.9 (L) 2023    HGB 11.0 (L) 2023    HCT 32.6 (L) 2023    HCT 33.0 (L) 2023    MCV 97.3 (H) 2023     2023         Assessment & Plan        (normal spontaneous vaginal delivery)      Vane Maguire is Day 1  post-partum       Plan:  Continue current care. Repeat CBC in AM      Shanae Peacock CNM  2023  11:41 EDT

## 2023-06-11 NOTE — PAYOR COMM NOTE
"Dora Maguire (28 y.o. Female) [id number]      Date of Birth   1995    Social Security Number       Address   [address] [place] [address] [address] [place] [address]    Home Phone   [phone]    MRN   [id number]       Yazdanism   Buddhism    Marital Status                               Admission Date   6/10/23    Admission Type   Elective    Admitting Provider   Samantha Azevedo MD    Attending Provider   Samantha Azevedo MD    Department, Room/Bed   [facility] MOTHER BABY 4B, N438/1       Discharge Date       Discharge Disposition       Discharge Destination                                 Attending Provider: Samantha Azevedo MD    Allergies: No Known Allergies    Isolation: None   Infection: None   Code Status: CPR    Ht: 162.6 cm (64\")   Wt: 72.6 kg (160 lb)    Admission Cmt: None   Principal Problem: None                  Active Insurance as of 6/10/2023       Primary Coverage       Payor Plan Insurance Group Employer/Plan Group    ANTHEM MEDICAID ANTHEM MEDICAID [id number]       Payor Plan Address Payor Plan Phone Number Payor Plan Fax Number Effective Dates    [address] [address] [phone]  2022 - None Entered    [facility] [address]         Subscriber Name Subscriber Birth Date Member ID       DORA MAGUIRE 1995 [id number]                     Emergency Contacts        (Rel.) Home Phone Work Phone Mobile Phone    Lambert Chicas (Spouse) [phone] -- --              Insurance Information                  ANTHEM MEDICAID/ANTHEM MEDICAID Phone: [phone]    Subscriber: Dora Maguire Subscriber#: [id number]    Group#: [id number] Precert#: --             History & Physical        Samantha Azevedo MD at 06/10/23 0850          [facility]  Obstetric History and Physical    Chief Complaint   Patient presents with    Laboring       Subjective    Patient is a 28 y.o. female  currently at 40w3d, who presents with SROM around 0100 today. " She continues to leak fluid. She states that her ctx are not very strong.     Her prenatal care has been uncomplicated.  Her previous obstetric/gynecological history is noted and is remarkable for prior FTSVD x 1, had GDM with prior pregnancy but passed GCT with this pregnancy (54).    The following portions of the patients history were reviewed and updated as appropriate: current medications, allergies, past medical history, past surgical history, past family history, past social history, and problem list .       Prenatal Information:   Maternal Prenatal Labs  Blood Type No results found for: ABO   Rh Status No results found for: RH   Antibody Screen No results found for: ABSCRN   Gonnorhea No results found for: GCCX   Chlamydia No results found for: CLAMYDCU   RPR No results found for: RPR   Syphilis Antibody No results found for: SYPHILIS   Rubella No results found for: RUBELLAIGGIN   Hepatitis B Surface Antigen No results found for: HEPBSAG   HIV-1 Antibody No results found for: LABHIV1   Hepatitis C Antibody No results found for: HEPCAB   Rapid Urin Drug Screen No results found for: AMPMETHU, BARBITSCNUR, LABBENZSCN, LABMETHSCN, LABOPIASCN, THCURSCR, COCAINEUR, AMPHETSCREEN, PROPOXSCN, BUPRENORSCNU, METAMPSCNUR, OXYCODONESCN, TRICYCLICSCN   Group B Strep Culture No results found for: GBSANTIGEN           External Prenatal Results       Pregnancy Outside Results - Transcribed From Office Records - See Scanned Records For Details       Test Value Date Time    ABO  O  02/10/23 1621    Rh  Positive  02/10/23 1621    Antibody Screen  Negative  03/23/23 1154       Negative  02/10/23 1621    Varicella IgG       Rubella  5.20 index 02/10/23 1621    Hgb  12.6 g/dL 06/10/23 0642       11.6 g/dL 03/23/23 1154       11.7 g/dL 02/10/23 1621    Hct  37.5 % 06/10/23 0642       34.5 % 03/23/23 1154       34.0 % 02/10/23 1621    Glucose Fasting GTT       Glucose Tolerance Test 1 hour       Glucose Tolerance Test 3 hour        Gonorrhea (discrete)       Chlamydia (discrete)       RPR  Non-Reactive  22 1655    VDRL       Syphilis Antibody       HBsAg  Non-Reactive  22 1655    Herpes Simplex Virus PCR       Herpes Simplex VIrus Culture       HIV  Non-Reactive  02/10/23 1621    Hep C RNA Quant PCR       Hep C Antibody  Non-Reactive  02/10/23 1621    AFP       Group B Strep       GBS Susceptibility to Clindamycin       GBS Susceptibility to Erythromycin       Fetal Fibronectin       Genetic Testing, Maternal Blood                 Drug Screening       Test Value Date Time    Urine Drug Screen       Amphetamine Screen  Negative  02/10/23 1621    Barbiturate Screen  Negative  02/10/23 1621    Benzodiazepine Screen  Negative  02/10/23 1621    Methadone Screen  Negative  02/10/23 1621    Phencyclidine Screen  Negative  02/10/23 1621    Opiates Screen  Negative  02/10/23 1621    THC Screen  Negative  02/10/23 1621    Cocaine Screen       Propoxyphene Screen  Negative  02/10/23 1621    Buprenorphine Screen  Negative  02/10/23 1621    Methamphetamine Screen       Oxycodone Screen  Negative  02/10/23 1621    Tricyclic Antidepressants Screen  Negative  02/10/23 1621              Legend    ^: Historical                              Past OB History:       OB History    Para Term  AB Living   2 1 1 0 0 1   SAB IAB Ectopic Molar Multiple Live Births   0 0 0 0 0 1      # Outcome Date GA Lbr Hayes/2nd Weight Sex Delivery Anes PTL Lv   2 Current            1 Term 22 40w5d  3120 g (6 lb 14.1 oz) F Vag-Spont None N YUMIKO      Name: ENRIQUETA CALDERÓN      Apgar1: 7  Apgar5: 8       Past Medical History: Past Medical History:   Diagnosis Date    Gestational diabetes     with first pregnancy, not the second      Past Surgical History Past Surgical History:   Procedure Laterality Date    LASIK        Family History: Family History   Problem Relation Age of Onset    Hypertension Father       Social History:  reports that she has  never smoked. She has never used smokeless tobacco.   reports no history of alcohol use.   reports no history of drug use.                   General ROS Negative Findings:Headaches, Visual Changes, Epigastric pain, Anorexia, Nausia/Vomiting, ROM, and Vaginal Bleeding    ROS      Objective      Vital Signs Range for the last 24 hours  Temperature: Temp:  [98 °F (36.7 °C)-98.4 °F (36.9 °C)] 98 °F (36.7 °C)   Temp Source: Temp src: Oral   BP: BP: (112-119)/(63-71) 116/69   Pulse: Heart Rate:  [70-75] 71   Respirations: Resp:  [16-18] 18   SPO2:     O2 Amount (l/min):     O2 Devices Device (Oxygen Therapy): room air   Weight: Weight:  [72.6 kg (160 lb)] 72.6 kg (160 lb)     Physical Examination:   General:   alert, appears stated age, and cooperative   Skin:   normal   HEENT:  normal   Lungs:   Normal respiratory effort, no respiratory distress   Heart:   Regular rate   Abdomen:  Soft, gravid, nontender   Lower Extremeties  no edema   Pelvis:  Exam deferred         Presentation: vtx   Cervix: Exam by: MD   Dilation:  2.5cm   Effacement: Cervical Effacement: 70-80%   Station:  -2       Fetal Heart Rate Assessment   Method: Fetal HR Assessment Method: Telemetry/Wireless Fetal HR Monitor   Beats/min: Fetal HR (beats/min): 135   Baseline: Fetal HR Baseline: normal range   Varibility: Fetal HR Variability: moderate (amplitude range 6 to 25 bpm)   Accels: Fetal HR Accelerations: greater than/equal to 15 bpm, lasting at least 15 seconds   Decels: Fetal HR Decelerations: absent   Tracing Category:  I     Uterine Assessment   Method: Method: Telemetry/Wireless Uterine Activity Monitor   Frequency (min): Contraction Frequency (Minutes): 2.5-4.5   Ctx Count in 10 min:     Duration:     Intensity:     Intensity by IUPC:     Resting Tone: Uterine Resting Tone: soft by palpation   Resting Tone by IUPC:     [place] Units:       Laboratory Results:   Lab Results (last 24 hours)       Procedure Component Value Units Date/Time     Preeclampsia Panel [[id number]]  (Abnormal) Collected: 06/10/23 0642    Specimen: Blood Updated: 06/10/23 0717     Alkaline Phosphatase 126 U/L      ALT (SGPT) 13 U/L      AST (SGOT) 20 U/L      Creatinine 0.57 mg/dL      Total Bilirubin 0.3 mg/dL       U/L      Comment: Specimen hemolyzed.  Results may be affected.        Uric Acid 4.9 mg/dL     CBC (No Diff) [[id number]]  (Abnormal) Collected: 06/10/23 0642    Specimen: Blood Updated: 06/10/23 0656     WBC 10.86 10*3/mm3      RBC 3.88 10*6/mm3      Hemoglobin 12.6 g/dL      Hematocrit 37.5 %      MCV 96.6 fL      MCH 32.5 pg      MCHC 33.6 g/dL      RDW 12.5 %      RDW-SD 43.8 fl      MPV 9.5 fL      Platelets 218 10*3/mm3           Radiology Review:  Imaging Results (Last 24 Hours)       ** No results found for the last 24 hours. **          Other Studies:    Assessment & Plan      Normal labor        Assessment:  1.  Intrauterine pregnancy at 40w3d weeks gestation with reassuring fetal status.    2.  PROM - meconium stained fluid  3.  GBS positive    Plan:  1. Admit to LD; was initially managed expectantly, however no change in SVE after >4h and pt reports ctx are mild, will augment with pitocin per protocol; PCN for GBS ppx; plans unmedicated labor and delivery, may have analgesics and/or epidural if desires  2. Plan of care has been reviewed with patient.  3.  Risks, benefits of treatment plan have been discussed.  4.  All questions have been answered.        Samantha Azevedo MD  6/10/2023  08:51 EDT        Electronically signed by Samantha Azevedo MD at 06/10/23 0857       H&P signed by [employer], [employer] at 06/10/23 2697         [Media Unavailable] Scan on 6/10/2023 by [employer], [employer]: PRENATAL RECORDS, [facility], 06/10/2023          Electronically signed by [employer], [employer] at 06/10/23 1897          Physician Progress Notes (last 48 hours)        Ranjith Rodrigues MD at 06/10/23 0554            Vane Maguire  [id number]  1995      Pt presents  with SROM.  Pt has refused IV or any meds.  Pt is GBS positive.  I discussed with pt reason for IV antibiotics (risk of baby getting infection or sepsis with GBS, which could cause significant damage to baby or even death if sepsis develops).  Recommended saline lock for IV PCN and in case of emergency.  Pt will consider and let nurse know.    Ranjith Rodrigues MD  6/10/2023  05:55 EDT       Electronically signed by Ranjith Rodrigues MD at 06/10/23 2418

## 2023-06-11 NOTE — ANESTHESIA POSTPROCEDURE EVALUATION
Patient: Vane Maguire    Procedure Summary       Date: 06/10/23 Room / Location:     Anesthesia Start: 1342 Anesthesia Stop: 1959    Procedure: LABOR ANALGESIA Diagnosis:     Scheduled Providers:  Provider: Gerardo Navarrete DO    Anesthesia Type: epidural ASA Status: 2            Anesthesia Type: epidural    Vitals  Vitals Value Taken Time   /56 06/11/23 0700   Temp 97.7 °F (36.5 °C) 06/11/23 0700   Pulse 84 06/11/23 0700   Resp 14 06/11/23 0700   SpO2 100 % 06/10/23 1354           Post Anesthesia Care and Evaluation    Patient location during evaluation: bedside  Patient participation: complete - patient participated  Level of consciousness: awake and awake and alert  Pain score: 0  Pain management: satisfactory to patient    Airway patency: patent  Anesthetic complications: No anesthetic complications  PONV Status: none  Cardiovascular status: acceptable, hemodynamically stable and stable  Respiratory status: acceptable  Hydration status: stable  Post Neuraxial Block status: Motor and sensory function returned to baseline and No signs or symptoms of PDPH

## 2023-06-11 NOTE — LACTATION NOTE
06/11/23 0946   Maternal Information   Date of Referral 06/11/23   Person Making Referral lactation consultant   Maternal Reason for Referral   (courtesy visit for new delivery. Hx:  last child for 8mos.)   Maternal Assessment   Breast Size Issue none   Breast Shape Bilateral:;round   Breast Density Bilateral:;soft   Nipples Bilateral:;everted   Left Nipple Symptoms intact;nontender   Right Nipple Symptoms intact;nontender   Maternal Infant Feeding   Maternal Emotional State independent;receptive  ([facility]. She reports feedings are going well. Pt to call out if she needs any help breastfeeding.)   Infant Positioning cradle  (Right)   Signs of Milk Transfer deep jaw excursions noted   Latch Assistance none needed   Support Person Involvement actively supporting mother   Milk Expression/Equipment   Breast Pump Type double electric, personal  (Pt has a Medela pump)

## 2023-06-11 NOTE — PAYOR COMM NOTE
Vane Maguire (28 y.o. Female)       Problem List             Codes Noted - Resolved       Hospital     (normal spontaneous vaginal delivery) ICD-10-CM: O80  ICD-9-CM: 650 6/10/2023 - Present        History & Physical        Samantha Azevedo MD at 06/10/23 0850          [facility]  Obstetric History and Physical    Chief Complaint   Patient presents with    Laboring       Subjective    Patient is a 28 y.o. female  currently at 40w3d, who presents with SROM around 0100 today. She continues to leak fluid. She states that her ctx are not very strong.     Her prenatal care has been uncomplicated.  Her previous obstetric/gynecological history is noted and is remarkable for prior FTSVD x 1, had GDM with prior pregnancy but passed GCT with this pregnancy (54).    The following portions of the patients history were reviewed and updated as appropriate: current medications, allergies, past medical history, past surgical history, past family history, past social history, and problem list .       Prenatal Information:   Maternal Prenatal Labs  Blood Type No results found for: ABO   Rh Status No results found for: RH   Antibody Screen No results found for: ABSCRN   Gonnorhea No results found for: GCCX   Chlamydia No results found for: CLAMYDCU   RPR No results found for: RPR   Syphilis Antibody No results found for: SYPHILIS   Rubella No results found for: RUBELLAIGGIN   Hepatitis B Surface Antigen No results found for: HEPBSAG   HIV-1 Antibody No results found for: LABHIV1   Hepatitis C Antibody No results found for: HEPCAB   Rapid Urin Drug Screen No results found for: AMPMETHU, BARBITSCNUR, LABBENZSCN, LABMETHSCN, LABOPIASCN, THCURSCR, COCAINEUR, AMPHETSCREEN, PROPOXSCN, BUPRENORSCNU, METAMPSCNUR, OXYCODONESCN, TRICYCLICSCN   Group B Strep Culture No results found for: GBSANTIGEN           External Prenatal Results       Pregnancy Outside Results - Transcribed From Office Records - See Scanned Records For  Details       Test Value Date Time    ABO  O  02/10/23 1621    Rh  Positive  02/10/23 1621    Antibody Screen  Negative  23 1154       Negative  02/10/23 1621    Varicella IgG       Rubella  5.20 index 02/10/23 1621    Hgb  12.6 g/dL 06/10/23 0642       11.6 g/dL 23 1154       11.7 g/dL 02/10/23 1621    Hct  37.5 % 06/10/23 0642       34.5 % 23 1154       34.0 % 02/10/23 1621    Glucose Fasting GTT       Glucose Tolerance Test 1 hour       Glucose Tolerance Test 3 hour       Gonorrhea (discrete)       Chlamydia (discrete)       RPR  Non-Reactive  22 1655    VDRL       Syphilis Antibody       HBsAg  Non-Reactive  22 1655    Herpes Simplex Virus PCR       Herpes Simplex VIrus Culture       HIV  Non-Reactive  02/10/23 1621    Hep C RNA Quant PCR       Hep C Antibody  Non-Reactive  02/10/23 1621    AFP       Group B Strep       GBS Susceptibility to Clindamycin       GBS Susceptibility to Erythromycin       Fetal Fibronectin       Genetic Testing, Maternal Blood                 Drug Screening       Test Value Date Time    Urine Drug Screen       Amphetamine Screen  Negative  02/10/23 1621    Barbiturate Screen  Negative  02/10/23 1621    Benzodiazepine Screen  Negative  02/10/23 1621    Methadone Screen  Negative  02/10/23 1621    Phencyclidine Screen  Negative  02/10/23 1621    Opiates Screen  Negative  02/10/23 1621    THC Screen  Negative  02/10/23 1621    Cocaine Screen       Propoxyphene Screen  Negative  02/10/23 1621    Buprenorphine Screen  Negative  02/10/23 1621    Methamphetamine Screen       Oxycodone Screen  Negative  02/10/23 1621    Tricyclic Antidepressants Screen  Negative  02/10/23 1621              Legend    ^: Historical                              Past OB History:       OB History    Para Term  AB Living   2 1 1 0 0 1   SAB IAB Ectopic Molar Multiple Live Births   0 0 0 0 0 1      # Outcome Date GA Lbr Hayes/2nd Weight Sex Delivery Anes PTL Lv   2 Current             1 Term 06/26/22 40w5d  3120 g (6 lb 14.1 oz) F Vag-Spont None N YUMIKO      Name: ENRIQUETA CALDERÓN      Apgar1: 7  Apgar5: 8       Past Medical History: Past Medical History:   Diagnosis Date    Gestational diabetes     with first pregnancy, not the second      Past Surgical History Past Surgical History:   Procedure Laterality Date    LASIK        Family History: Family History   Problem Relation Age of Onset    Hypertension Father       Social History:  reports that she has never smoked. She has never used smokeless tobacco.   reports no history of alcohol use.   reports no history of drug use.                   General ROS Negative Findings:Headaches, Visual Changes, Epigastric pain, Anorexia, Nausia/Vomiting, ROM, and Vaginal Bleeding    ROS      Objective      Vital Signs Range for the last 24 hours  Temperature: Temp:  [98 °F (36.7 °C)-98.4 °F (36.9 °C)] 98 °F (36.7 °C)   Temp Source: Temp src: Oral   BP: BP: (112-119)/(63-71) 116/69   Pulse: Heart Rate:  [70-75] 71   Respirations: Resp:  [16-18] 18   SPO2:     O2 Amount (l/min):     O2 Devices Device (Oxygen Therapy): room air   Weight: Weight:  [72.6 kg (160 lb)] 72.6 kg (160 lb)     Physical Examination:   General:   alert, appears stated age, and cooperative   Skin:   normal   HEENT:  normal   Lungs:   Normal respiratory effort, no respiratory distress   Heart:   Regular rate   Abdomen:  Soft, gravid, nontender   Lower Extremeties  no edema   Pelvis:  Exam deferred         Presentation: vtx   Cervix: Exam by: MD   Dilation:  2.5cm   Effacement: Cervical Effacement: 70-80%   Station:  -2       Fetal Heart Rate Assessment   Method: Fetal HR Assessment Method: Telemetry/Wireless Fetal HR Monitor   Beats/min: Fetal HR (beats/min): 135   Baseline: Fetal HR Baseline: normal range   Varibility: Fetal HR Variability: moderate (amplitude range 6 to 25 bpm)   Accels: Fetal HR Accelerations: greater than/equal to 15 bpm, lasting at least 15 seconds    Decels: Fetal HR Decelerations: absent   Tracing Category:  I     Uterine Assessment   Method: Method: Telemetry/Wireless Uterine Activity Monitor   Frequency (min): Contraction Frequency (Minutes): 2.5-4.5   Ctx Count in 10 min:     Duration:     Intensity:     Intensity by IUPC:     Resting Tone: Uterine Resting Tone: soft by palpation   Resting Tone by IUPC:     [place] Units:       Laboratory Results:   Lab Results (last 24 hours)       Procedure Component Value Units Date/Time    Preeclampsia Panel [[id number]]  (Abnormal) Collected: 06/10/23 0642    Specimen: Blood Updated: 06/10/23 0717     Alkaline Phosphatase 126 U/L      ALT (SGPT) 13 U/L      AST (SGOT) 20 U/L      Creatinine 0.57 mg/dL      Total Bilirubin 0.3 mg/dL       U/L      Comment: Specimen hemolyzed.  Results may be affected.        Uric Acid 4.9 mg/dL     CBC (No Diff) [[id number]]  (Abnormal) Collected: 06/10/23 0642    Specimen: Blood Updated: 06/10/23 0656     WBC 10.86 10*3/mm3      RBC 3.88 10*6/mm3      Hemoglobin 12.6 g/dL      Hematocrit 37.5 %      MCV 96.6 fL      MCH 32.5 pg      MCHC 33.6 g/dL      RDW 12.5 %      RDW-SD 43.8 fl      MPV 9.5 fL      Platelets 218 10*3/mm3           Radiology Review:  Imaging Results (Last 24 Hours)       ** No results found for the last 24 hours. **          Other Studies:    Assessment & Plan      Normal labor        Assessment:  1.  Intrauterine pregnancy at 40w3d weeks gestation with reassuring fetal status.    2.  PROM - meconium stained fluid  3.  GBS positive    Plan:  1. Admit to LD; was initially managed expectantly, however no change in SVE after >4h and pt reports ctx are mild, will augment with pitocin per protocol; PCN for GBS ppx; plans unmedicated labor and delivery, may have analgesics and/or epidural if desires  2. Plan of care has been reviewed with patient.  3.  Risks, benefits of treatment plan have been discussed.  4.  All questions have been answered.        Samantha FINE  MD Roberto Carlos  6/10/2023  08:51 EDT        Electronically signed by Samantha Azevedo MD at 06/10/23 0857       H&P signed by [employer], [employer] at 06/10/23 0447         [Media Unavailable] Scan on 6/10/2023 by [employer], [employer]: [facility]AL RECORDS, [facility], 06/10/2023          Electronically signed by [employer], [employer] at 06/10/23 0447       Facility-Administered Medications as of 6/10/2023   Medication Dose Route Frequency Provider Last Rate Last Admin    [COMPLETED] acetaminophen (TYLENOL) tablet 1,000 mg  1,000 mg Oral Once Samantha Azevedo MD   1,000 mg at 06/10/23 2035    ampicillin 2 g/100 mL 0.9% NS (MBP)  2 g Intravenous Q6H Samantha Azevedo MD        bupivacaine (PF) (MARCAINE) 0.25 % injection   Epidural PRN Gerardo Navarrete, DO   12 mL at 06/10/23 1354    carboprost (HEMABATE) injection 250 mcg  250 mcg Intramuscular PRN Ranjith Rodrigues MD        diphenhydrAMINE (BENADRYL) injection 12.5 mg  12.5 mg Intravenous Q8H PRN Greardo Navarrete, DO        ePHEDrine Sulfate (Pressors) 5 MG/ML injection  - ADS Override Pull             ePHEDrine Sulfate (Pressors) 5 MG/ML injection 10 mg  10 mg Intravenous Q10 Min PRN Gerardo Navarrete, DO        famotidine (PEPCID) injection 20 mg  20 mg Intravenous Once PRN Gerardo Navarrete, DO        fentaNYL citrate (PF) (SUBLIMAZE) injection   Epidural PRN Gerardo Navarrete, DO   100 mcg at 06/10/23 1353    gentamicin (GARAMYCIN) 310 mg in sodium chloride 0.9 % 100 mL IVPB  5 mg/kg (Adjusted) Intravenous Q24H Samantha Azevedo MD        [COMPLETED] lactated ringers bolus 1,000 mL  1,000 mL Intravenous Once Samantha Azevedo MD 4,000 mL/hr at 06/10/23 1333 1,000 mL at 06/10/23 1333    lactated ringers bolus 1,000 mL  1,000 mL Intravenous PRN Gerardo Navarrete DO        lactated ringers infusion  125 mL/hr Intravenous Continuous Samantha Azevedo  mL/hr at 06/10/23 1417 125 mL/hr at 06/10/23 1417    lidocaine PF 1% (XYLOCAINE) injection 5 mL  5 mL Intradermal PRN Ranjith Rodrigues,  MD        lidocaine-EPINEPHrine (XYLOCAINE W/EPI) 1.5 %-1:121682 injection   Epidural PRN Gerardo Navarrete DO   2 mL at 06/10/23 1352    methylergonovine (METHERGINE) injection 200 mcg  200 mcg Intramuscular Once PRN High, Ranjith LOPES MD        metoclopramide (REGLAN) injection 10 mg  10 mg Intravenous Once PRN Gerardo Navarrete DO        mineral oil liquid 30 mL  30 mL Topical Once High, Ranjith LOPES MD        miSOPROStol (CYTOTEC) tablet 800 mcg  800 mcg Rectal PRN High, Ranjith LOPES MD        ondansetron (ZOFRAN) tablet 4 mg  4 mg Oral Q6H PRN High, Ranjith LOPES MD        Or    ondansetron (ZOFRAN) injection 4 mg  4 mg Intravenous Q6H PRN High, Ranjith LOPES MD        ondansetron (ZOFRAN) injection 4 mg  4 mg Intravenous Once PRN Gerardo Navarrete DO        oxytocin (PITOCIN) 30 units in 0.9% sodium chloride 500 mL (premix)  2-20 scott-units/min Intravenous Titrated Samantha Azevedo MD 4 mL/hr at 06/10/23 1700 4 scott-units/min at 06/10/23 1700    [COMPLETED] oxytocin (PITOCIN) 30 units in 0.9% sodium chloride 500 mL (premix)  999 mL/hr Intravenous Once Samantha Azevedo  mL/hr at 06/10/23 1959 999 mL/hr at 06/10/23 1959    Followed by    oxytocin (PITOCIN) 30 units in 0.9% sodium chloride 500 mL (premix)  250 mL/hr Intravenous Continuous Samantha Azevedo  mL/hr at 06/10/23 2017 250 mL/hr at 06/10/23 2017    oxytocin (PITOCIN) injection 10 Units  10 Units Intramuscular Once High, Ranjith LOPES MD        [COMPLETED] penicillin g 5 mu/100 mL 0.9% NS IVPB (mbp)  5 Million Units Intravenous Once High, Ranjith LOPES MD   5 Million Units at 06/10/23 0650    Followed by    penicillin G in iso-osmotic dextrose IVPB 3 million units (premix)  3 Million Units Intravenous Q4H High, Ranjith LOPES MD   3 Million Units at 06/10/23 1852    ropivacaine (NAROPIN) 0.2 % injection  15 mL/hr Epidural Continuous Gerardo Navarrete, DO   Stopped at 06/10/23 2020    Sod Citrate-Citric Acid (BICITRA) solution 30 mL  30 mL Oral Once Gerardo Navarrete,  DO        sodium chloride 0.9 % flush 10 mL  10 mL Intravenous PRN Ranjith Rodrigues MD        sodium chloride 0.9 % flush 3 mL  3 mL Intravenous Q12H Ranjith Rodrigues MD         Orders (last 24 hrs)        Start     Ordered    06/10/23 2145  ampicillin 2 g/100 mL 0.9% NS (MBP)  Every 6 Hours         06/10/23 2051    06/10/23 2145  gentamicin (GARAMYCIN) 310 mg in sodium chloride 0.9 % 100 mL IVPB  Every 24 Hours        Note to Pharmacy: Separate Administration Time With Ampicillin and Other Penicillins (Ampicillin / Penicillins deactivate gentamicin when infused together).    06/10/23 2051    06/10/23 2115  oxytocin (PITOCIN) 30 units in 0.9% sodium chloride 500 mL (premix)  Continuous        See Hyperspace for full Linked Orders Report.    06/10/23 2004    06/10/23 2115  acetaminophen (TYLENOL) tablet 1,000 mg  Once         06/10/23 2022    06/10/23 2100  oxytocin (PITOCIN) 30 units in 0.9% sodium chloride 500 mL (premix)  Once        See Hyperspace for full Linked Orders Report.    06/10/23 2004    06/10/23 2045  oxytocin (PITOCIN) injection 10 Units  Once         06/10/23 1959    06/10/23 2000  Notify Provider (Specified)  Until Discontinued         06/10/23 1959    06/10/23 2000  Vital Signs Per Hospital Policy  Per Hospital Policy         06/10/23 1959    06/10/23 2000  Fundal & Lochia Check  Per Order Details        Comments: Every 15 Minutes x4, Then Every 30 Minutes x2, Then Every Shift    06/10/23 1959    06/10/23 2000  Fundal & Lochia Check  Every Shift       06/10/23 1959    06/10/23 2000  Diet: Regular/House Diet; Texture: Regular Texture (IDDSI 7); Fluid Consistency: Thin (IDDSI 0)  Diet Effective Now         06/10/23 1959    06/10/23 2000  Nurse may remove epidural catheter after delivery.  Until Discontinued         06/10/23 1959    06/10/23 2000  Transfer to postpartum when discharge criteria met.  Until Discontinued         06/10/23 1959    06/10/23 1959  methylergonovine (METHERGINE) injection 200  mcg  Once As Needed         06/10/23 1959    06/10/23 1959  carboprost (HEMABATE) injection 250 mcg  As Needed         06/10/23 1959    06/10/23 1959  miSOPROStol (CYTOTEC) tablet 800 mcg  As Needed         06/10/23 1959    06/10/23 1430  ropivacaine (NAROPIN) 0.2 % injection  Continuous         06/10/23 1336    06/10/23 1430  Sod Citrate-Citric Acid (BICITRA) solution 30 mL  Once         06/10/23 1336    06/10/23 1345  lactated ringers bolus 1,000 mL  Once         06/10/23 1252    06/10/23 1337  Vital Signs Per Anesthesia Guidelines  Per Order Details        Comments: Every 3 Minutes x20 Minutes Following Epidural Dosing, Then Every 15 Minutes If Stable    06/10/23 1336    06/10/23 1337  Start IV with #16 or #18 gauge angiocath.  Once         06/10/23 1336    06/10/23 1337  Nurse or anesthesiologist to remain with patient for 15 minutes following dosing.  Until Discontinued         06/10/23 1336    06/10/23 1337  Facilitate maternal postion on side and maintain uterine displacement.  Until Discontinued         06/10/23 1336    06/10/23 1337  Consult anesthesia services prior to changing epidural infusion/rate.  Until Discontinued         06/10/23 1336    06/10/23 1336  lactated ringers bolus 1,000 mL  As Needed         06/10/23 1336    06/10/23 1336  ePHEDrine Sulfate (Pressors) 5 MG/ML injection 10 mg  Every 10 Minutes PRN         06/10/23 1336    06/10/23 1336  metoclopramide (REGLAN) injection 10 mg  Once As Needed         06/10/23 1336    06/10/23 1336  ondansetron (ZOFRAN) injection 4 mg  Once As Needed         06/10/23 1336    06/10/23 1336  famotidine (PEPCID) injection 20 mg  Once As Needed         06/10/23 1336    06/10/23 1336  diphenhydrAMINE (BENADRYL) injection 12.5 mg  Every 8 Hours PRN         06/10/23 1336    06/10/23 1331  ePHEDrine Sulfate (Pressors) 5 MG/ML injection  - ADS Override Pull        Note to Pharmacy: Created by cabinet override    06/10/23 3909    06/10/23 1300  lactated ringers  infusion  Continuous         06/10/23 1252    06/10/23 1100  penicillin G in iso-osmotic dextrose IVPB 3 million units (premix)  Every 4 Hours        See Hyperspace for full Linked Orders Report.    06/10/23 0600    06/10/23 0945  oxytocin (PITOCIN) 30 units in 0.9% sodium chloride 500 mL (premix)  Titrated         06/10/23 0857    06/10/23 0900  sodium chloride 0.9 % flush 3 mL  Every 12 Hours Scheduled         06/10/23 0600    06/10/23 0720  Type & Screen  STAT         06/10/23 0334    06/10/23 0700  mineral oil liquid 30 mL  Once         06/10/23 0600    06/10/23 0647  lactated ringers infusion  As Needed,   Status:  Discontinued         06/10/23 0648    06/10/23 0645  penicillin g 5 mu/100 mL 0.9% NS IVPB (mbp)  Once        See Hyperspace for full Linked Orders Report.    06/10/23 0600    06/10/23 0559  ondansetron (ZOFRAN) tablet 4 mg  Every 6 Hours PRN        See Hyperspace for full Linked Orders Report.    06/10/23 0600    06/10/23 0559  ondansetron (ZOFRAN) injection 4 mg  Every 6 Hours PRN        See Hyperspace for full Linked Orders Report.    06/10/23 0600    06/10/23 0559  Code Status and Medical Interventions:  Continuous         06/10/23 0600    06/10/23 0559  VTE Prophylaxis Not Indicated: No Risk Factors (0); </= 3 (Low Risk)  Once         06/10/23 0600    06/10/23 0559  Diet: Liquid Diets; Clear Liquid; Texture: Regular Texture (IDDSI 7); Fluid Consistency: Thin (IDDSI 0)  Diet Effective Now,   Status:  Canceled         06/10/23 0600    06/10/23 0558  Admit To Obstetrics Inpatient  Once         06/10/23 0600    06/10/23 0558  Obtain Informed Consent  Once         06/10/23 0600    06/10/23 0558  Vital Signs Per Hospital Policy  Per Hospital Policy         06/10/23 0600    06/10/23 0558  Mini-Prep Prior to Delivery  Once         06/10/23 0600    06/10/23 0558  Continuous Fetal Monitoring With NST on Admission and Prior to Initiation of Oxytocin.  Per Order Details        Comments: Continuous Fetal  Monitoring With NST on Admission & Prior to Initiation of Oxytocin.    06/10/23 0600    06/10/23 05  External Uterine Contraction Monitoring  Per Hospital Policy         06/10/23 0600    06/10/23 0558  Notify Provider (Specified)  Until Discontinued         06/10/23 0600    06/10/23 0558  Notify Provider of Tachysystole (Per Hospital Algorithm)  Until Discontinued         06/10/23 0600    06/10/23 0558  Notify Provider if Membranes Ruptured, Bleeding Greater Than 1 Pad Per Hour, Fetal Heart Tone Abnormality or Severe Pain  Until Discontinued         06/10/23 0600    06/10/23 0558  Initiate Group Beta Strep (GBS) Prophylaxis Protocol, If Criteria Met  Continuous        Comments: NO TREATMENT RECOMMENDED IF: 1) Maternal GBS Status Known Negative 2) Scheduled  Birth With Intact Membranes, Not in Labor 3) Maternal GBS Status Unknown, No Risk Factors  TREAT WITH ANTIBIOTICS IF:  1) Maternal GBS Status Known Positive 2) Maternal GBS Status Unknown With Risk Factors: a)  Previous Infant Affected By GBS Infection b) GBS Urinary Tract Infection (UTI) or Bacteriuria During Pregnancy c) Unexplained Maternal Fever (100.4F (38C) or Greater) During Labor d)  Prolonged Rupture of Membranes (18 or More Hours) e) Gestational Age Less Than 37 Weeks    06/10/23 0600    06/10/23 05  Insert Peripheral IV  Once         06/10/23 0600    06/10/23 0558  Saline Lock & Maintain IV Access  Continuous         06/10/23 0600    06/10/23 0557  lidocaine PF 1% (XYLOCAINE) injection 5 mL  As Needed         06/10/23 0600    06/10/23 0557  sodium chloride 0.9 % flush 10 mL  As Needed         06/10/23 0600    06/10/23 0335  CBC (No Diff)  STAT         06/10/23 0334    06/10/23 0335  Preeclampsia Panel  STAT         06/10/23 0334    Unscheduled  Position Change - For Intra-Uterine Resusitation for Hypertonus, HyperStimulation or Non-Reassuring Fetal Status  As Needed       06/10/23 0600    Signed and Held  Transfer Patient  Once          Signed and Held    Signed and Held  Code Status and Medical Interventions:  Continuous         Signed and Held    Signed and Held  Vital Signs Per hospital policy  Per Hospital Policy         Signed and Held    Signed and Held  Notify Physician  Until Discontinued         Signed and Held    Signed and Held  Up Ad Rocío  Until Discontinued         Signed and Held    Signed and Held  Ambulate Patient  Every Shift       Signed and Held    Signed and Held  Advance Diet As Tolerated -Regular  Until Discontinued         Signed and Held    Signed and Held  Fundal and Lochia Check  Per Hospital Policy        Comments: Q 15 min x 4, Q 30 min x 2, then Q Shift    Signed and Held    Signed and Held  RN to Assess Rh Status & Place RhIG Evaluation Order if Indicated  Continuous         Signed and Held    Signed and Held  Bladder Assessment  Per Order Details        Comments: Postpartum 1) Upon Admission to Unit & Every 4 Hours PRN Until Voiding. 2) Out of Bed to Void in 8 Hours.    Signed and Held    Signed and Held  Straight Cath  Per Order Details        Comments: Postpartum: If Distended & Unable to Void, May Repeat Once.    Signed and Held    Signed and Held  Indwelling Urinary Catheter  Per Order Details        Comments: Postpartum : After Straight Cathed x2 or if Greater Than 1000mL Residual, Insert Indwelling Urinary Catheter Until Further MD Order.    Signed and Held    Signed and Held  Apply Ice to Perineum  As Needed        Comments: For 20 min q 2 hrs    Signed and Held    Signed and Held  Waffle Cushion  As Needed        Comments: For perineal discomfort    Signed and Held    Signed and Held  Donut Ring  As Needed        Comments: For perineal pain    Signed and Held    Signed and Held  Sitz Bath  3 Times Daily        Comments: PRN    Signed and Held    Signed and Held  Kpad  As Needed        Comments: For pain    Signed and Held    Signed and Held  Warm compress  As Needed         Signed and Held    Signed and Held   Breast pump to bed  Once         Signed and Held    Signed and Held  Apply ace wrap, tight bra, or binder  As Needed         Signed and Held    Signed and Held  Apply ice packs  As Needed         Signed and Held    Signed and Held  If indicated -- Please administer RH Immunoglobulin based on results of cord blood evaluation and fetal screen lab tests, pharmacy to dispense  Continuous        Comments: See process instructions for reference range details.    Signed and Held    Signed and Held  Hemoglobin & Hematocrit, Blood  Timed        Comments: Postpartum Day 1      Signed and Held    Signed and Held  sodium chloride 0.9 % flush 1-10 mL  As Needed         Signed and Held    Signed and Held  ibuprofen (ADVIL,MOTRIN) tablet 600 mg  Every 6 Hours PRN         Signed and Held    Signed and Held  acetaminophen (TYLENOL) tablet 650 mg  Every 6 Hours PRN         Signed and Held    Signed and Held  HYDROcodone-acetaminophen (NORCO) 5-325 MG per tablet 1 tablet  Every 4 Hours PRN        See Hyperspace for full Linked Orders Report.    Signed and Held    Signed and Held  HYDROcodone-acetaminophen (NORCO)  MG per tablet 1 tablet  Every 4 Hours PRN        See Hyperspace for full Linked Orders Report.    Signed and Held    Signed and Held  bisacodyl (DULCOLAX) suppository 10 mg  Daily PRN         Signed and Held    Signed and Held  magnesium hydroxide (MILK OF MAGNESIA) suspension 10 mL  Daily PRN         Signed and Held    Signed and Held  docusate sodium (COLACE) capsule 100 mg  2 Times Daily         Signed and Held    Signed and Held  polyethylene glycol (MIRALAX) packet 17 g  Daily PRN         Signed and Held    Signed and Held  lanolin topical 1 application  Every 1 Hour PRN         Signed and Held    Signed and Held  benzocaine-menthol (DERMOPLAST) 20-0.5 % topical spray  As Needed         Signed and Held    Signed and Held  witch hazel-glycerin (TUCKS) pad 1 pad  As Needed         Signed and Held    Signed and Held   Hydrocortisone (Perianal) (ANUSOL-HC) 2.5 % rectal cream 1 application  As Needed         Signed and Held    Signed and Held  benzocaine (AMERICAINE) 20 % rectal ointment 1 application  As Needed         Signed and Held    Signed and Held  ondansetron (ZOFRAN) tablet 4 mg  Every 6 Hours PRN        See Hyperspace for full Linked Orders Report.    Signed and Held    Signed and Held  ondansetron (ZOFRAN) injection 4 mg  Every 6 Hours PRN        See Hyperspace for full Linked Orders Report.    Signed and Held    Signed and Held  prenatal vitamin tablet 1 tablet  Daily         Signed and Held    Signed and Held  simethicone (MYLICON) chewable tablet 80 mg  4 Times Daily PRN         Signed and Held    Signed and Held  Place Sequential Compression Device  Once         Signed and Held    Signed and Held  Maintain Sequential Compression Device  Continuous         Signed and Held                     Operative/Procedure Notes (last 24 hours)        Samantha Azevedo MD at 06/10/23 2028           [facility]   Vaginal Delivery Note    Patient Name: Vane Maguire  : 1995  MRN: [id number]    Date of Delivery: 6/10/2023     Diagnosis     Pre & Post-Delivery:  Intrauterine pregnancy at 40w3d  Labor status: Spontaneous Onset of Labor     Normal labor             Problem List    Transfer to Postpartum     Review the Delivery Report for details.     Delivery     Delivery: Vaginal, Spontaneous    YOB: 2022    Time of Birth:  Gestational Age 7:55 PM   40w3d     Anesthesia: Epidural     Delivering clinician: Samantha Azevedo    Forceps?   No   Vacuum? No    Shoulder dystocia present: No        Delivery narrative:  Patient developed a fever of 100.6F just before pushing. FHR tracing reviewed, deep variables with moderate variability and recovery in between variables noted, no fetal tachycardia prior to delivery. Patient went on to complete an uncomplicated  at 40w3d over a first degree laceration.  Anterior shoulder delivered with ease. Infant initially vigorous at delivery so placed on maternal abdomen. Delayed cord clamping x 1 min. Cord doubly clamped and cut by father. Infant handed off to delivery room nurse for additional stimulation. DRT then called to room due to  tachycardia and fever. Cord blood and cord segment obtained. Placenta delivered spontaneously and appeared intact. Laceration repaired in standard fashion using 3-0 vicryl. Careful inspection of the vagina and perineum revealed only superficial abrasions which were hemostatic and did not require repair. Uterus became boggy with moderate bleeding. Clots were manually evacuated and uterine tone improved with fundal massage and IV pitocin administration. Patient was agreeable to PO anti-pyretic, but declines IV antibiotics at this time. GBS status was positive, was adequately treated with PCN throughout labor.        Infant     Findings: female  infant     Infant observations: Weight: No birth weight on file.   Length:   in  Observations/Comments:        Apgars: 8  @ 1 minute /    8  @ 5 minutes   Infant Name: Undecided     Placenta & Cord         Placenta delivered  Spontaneous  at   6/10/2023  7:59 PM     Cord: 3 vessels  present.   Nuchal Cord?  no   Cord blood obtained: Yes    Cord gases obtained:  No   Cord gas results: Venous:  No results found for: PHCVEN    Arterial:  No results found for: PHCART     Repair     Episiotomy: Not recorded     No    Lacerations: Yes  Laceration Information  Laceration Repaired?   Perineal:  first  yes   Periurethral:       Labial:       Sulcus:       Vaginal:       Cervical:         Suture used for repair: 3-0 Vicryl   Estimated Blood Loss: 700cc     Complications     maternal temperature    Disposition     Mother to Mother Baby/Postpartum  in stable condition currently.  Baby to NICU  in stable condition currently.    Samantha Azevedo MD  06/10/23  20:28 EDT          Electronically signed by Roberto Carlos  "Samantha FINE MD at 06/10/23 2042          Physician Progress Notes (last 24 hours)        Ranjith Rodrigues MD at 06/10/23 0554            Dora Maguire  [id number]  1995      Pt presents with SROM.  Pt has refused IV or any meds.  Pt is GBS positive.  I discussed with pt reason for IV antibiotics (risk of baby getting infection or sepsis with GBS, which could cause significant damage to baby or even death if sepsis develops).  Recommended saline lock for IV PCN and in case of emergency.  Pt will consider and let nurse know.    Ranjith Rodrigues MD  6/10/2023  05:55 EDT       Electronically signed by Ranjith Rodrigues MD at 06/10/23 0557        Date of Birth   1995    Social Security Number       Address   [address] [place] [address] [place] [address]    Home Phone   [phone]    [facility]   [id number]       [employer]   Jainism    Marital Status                               Admission Date   6/10/23    Admission Type   Elective    Admitting Provider   Samantha Azevedo MD    Attending Provider   Samantha Azevedo MD    Department, Room/Bed   [facility] LABOR DELIVERY, N315/1       Discharge Date       Discharge Disposition       Discharge Destination                                 Attending Provider: Samantha Azevedo MD    Allergies: No Known Allergies    Isolation: None   Infection: None   Code Status: CPR    Ht: 162.6 cm (64\")   Wt: 72.6 kg (160 lb)    Admission Cmt: None   Principal Problem: None                  Active Insurance as of 6/10/2023       Primary Coverage       Payor Plan Insurance Group Employer/Plan Group    ANTH MEDICAID [facility] MEDICAID [id number]       Payor Plan Address Payor Plan Phone Number Payor Plan Fax Number Effective Dates    [address] [address] [phone]  5/1/2022 - None Entered    [facility] [address]         Subscriber Name Subscriber Birth Date Member ID       DORA MAGUIRE 1995 [id number]                     Emergency Contacts       "  (Rel.) Home Phone Work Phone Mobile Phone    Lambert Chicas (Spouse) [phone] -- --

## 2023-06-12 VITALS
SYSTOLIC BLOOD PRESSURE: 109 MMHG | BODY MASS INDEX: 27.31 KG/M2 | TEMPERATURE: 97.9 F | HEIGHT: 64 IN | OXYGEN SATURATION: 100 % | DIASTOLIC BLOOD PRESSURE: 59 MMHG | RESPIRATION RATE: 18 BRPM | HEART RATE: 69 BPM | WEIGHT: 160 LBS

## 2023-06-12 LAB
DEPRECATED RDW RBC AUTO: 44 FL (ref 37–54)
ERYTHROCYTE [DISTWIDTH] IN BLOOD BY AUTOMATED COUNT: 12.5 % (ref 12.3–15.4)
HCT VFR BLD AUTO: 32.4 % (ref 34–46.6)
HGB BLD-MCNC: 10.9 G/DL (ref 12–15.9)
MCH RBC QN AUTO: 32.6 PG (ref 26.6–33)
MCHC RBC AUTO-ENTMCNC: 33.6 G/DL (ref 31.5–35.7)
MCV RBC AUTO: 97 FL (ref 79–97)
PLATELET # BLD AUTO: 207 10*3/MM3 (ref 140–450)
PMV BLD AUTO: 9.6 FL (ref 6–12)
RBC # BLD AUTO: 3.34 10*6/MM3 (ref 3.77–5.28)
WBC NRBC COR # BLD: 12.1 10*3/MM3 (ref 3.4–10.8)

## 2023-06-12 PROCEDURE — 85027 COMPLETE CBC AUTOMATED: CPT | Performed by: ADVANCED PRACTICE MIDWIFE

## 2023-06-12 RX ORDER — IBUPROFEN 600 MG/1
600 TABLET ORAL EVERY 6 HOURS PRN
Qty: 60 TABLET | Refills: 0 | Status: SHIPPED | OUTPATIENT
Start: 2023-06-12

## 2023-06-12 RX ADMIN — DOCUSATE SODIUM 100 MG: 100 CAPSULE, LIQUID FILLED ORAL at 08:48

## 2023-06-12 RX ADMIN — PRENATAL VITAMINS-IRON FUMARATE 27 MG IRON-FOLIC ACID 0.8 MG TABLET 1 TABLET: at 08:48

## 2023-06-12 NOTE — LACTATION NOTE
06/12/23 1045   Maternal Information   Date of Referral 06/12/23   Person Making Referral lactation consultant  (follow up prior to discharge)   Maternal Reason for Referral other (see comments)  (pt reports breastfeeding is going well; baby appeared to be cluster feeding through night, encouraged this is common; pt reports latch is comfortable; no additional questions at this time, PRN Lactation Consultant/Clinic contact encouraged)

## 2023-06-12 NOTE — PLAN OF CARE
Problem: Adult Inpatient Plan of Care  Goal: Plan of Care Review  Outcome: Met  Goal: Patient-Specific Goal (Individualized)  Outcome: Met  Goal: Absence of Hospital-Acquired Illness or Injury  Outcome: Met  Intervention: Identify and Manage Fall Risk  Recent Flowsheet Documentation  Taken 6/12/2023 0848 by Melissa Rajan RN  Safety Promotion/Fall Prevention: safety round/check completed  Intervention: Prevent Skin Injury  Recent Flowsheet Documentation  Taken 6/12/2023 0848 by Melissa Rajan RN  Body Position: sitting up in bed  Intervention: Prevent and Manage VTE (Venous Thromboembolism) Risk  Recent Flowsheet Documentation  Taken 6/12/2023 0848 by Melissa Rajan RN  Activity Management: up ad ramone  Goal: Optimal Comfort and Wellbeing  Outcome: Met  Intervention: Provide Person-Centered Care  Recent Flowsheet Documentation  Taken 6/12/2023 0848 by Melissa Rajan RN  Trust Relationship/Rapport: care explained  Goal: Readiness for Transition of Care  Outcome: Met   Goal Outcome Evaluation:

## 2023-06-12 NOTE — PROGRESS NOTES
6/12/2023  PPD #2    Subjective   Vane feels well.  Patient describes her lochia as less than menses.  Pain is well controlled       Objective   Temp: Temp:  [97.4 °F (36.3 °C)-97.9 °F (36.6 °C)] 97.9 °F (36.6 °C) Temp src: Oral   BP: BP: ()/(58-65) 109/59        Pulse: Heart Rate:  [69-75] 69  RR: Resp:  [16-18] 18    General:  No acute distress   Abdomen: Fundus firm and beneath umbilicus   Pelvis: deferred     Lab Results   Component Value Date    WBC 12.10 (H) 06/12/2023    HGB 10.9 (L) 06/12/2023    HCT 32.4 (L) 06/12/2023    MCV 97.0 06/12/2023     06/12/2023    HEPBSAG Non-Reactive 01/03/2022    AST 20 06/10/2023    URICACID 4.9 06/10/2023       Assessment  PPD# 2 after vaginal delivery  Chorioamnionitis - afebrile since right after delivery, declined IV amp/gent; leukocytosis improved    Plan  Discharge to home  Follow up with [facility]  in 6 weeks  Advised no tampons, intercourse, or tub baths for 6 weeks.       This note has been electronically signed.    Samantha Azevedo MD  08:26 EDT  June 12, 2023

## 2023-06-12 NOTE — DISCHARGE SUMMARY
[facility]  Vaginal Delivery Discharge Summary      Patient: Vane Maguire      MR#:[id number]  Admission  Diagnosis: IUP @ 40w3d, SROM, meconium fluid  Discharge Diagnosis: PPD#2 s/p  @ 40w3d, chorioamnionitis (resolved)    Date of Admission: 6/10/2023  Date of Discharge:  2023    Procedures:  Vaginal, Spontaneous     6/10/2023    7:55 PM      Service:  Obstetrics    Hospital Course:  Patient underwent vaginal delivery and remained in the hospital for 2 days. She developed a fever just before pushing and immediately postpartum. The fever resolved with PO tylenol and pt declined IV abx for tx of chorioamnionitis. She was GBS positive and did receive IV PCN in labor. During her postpartum course she remained afebrile and hemodynamically stable.  On the day of discharge, she was eating, ambulating and voiding without difficulty.      Lab Results   Component Value Date    WBC 12.10 (H) 2023    HGB 10.9 (L) 2023    HCT 32.4 (L) 2023    MCV 97.0 2023     2023    URICACID 4.9 06/10/2023    AST 20 06/10/2023    ALT 13 06/10/2023     06/10/2023     Results from last 7 days   Lab Units 06/10/23  0810   ABO TYPING  O   RH TYPING  Positive   ANTIBODY SCREEN  Negative       Discharge Medications     Discharge Medications        New Medications        Instructions Start Date   ibuprofen 600 MG tablet  Commonly known as: ADVIL,MOTRIN   600 mg, Oral, Every 6 Hours PRN             Continue These Medications        Instructions Start Date   prenatal (CLASSIC) vitamin  tablet  Generic drug: prenatal vitamin   Oral, 3 Times Daily               Discharge Disposition:  To Home    Discharge Condition:  Stable    Discharge Diet: Regular    Activity at Discharge: Pelvic rest    Follow-up Appointments  6 weeks      Samantha Azevedo MD  23  08:27 EDT